# Patient Record
Sex: MALE | Race: WHITE | Employment: OTHER | URBAN - METROPOLITAN AREA
[De-identification: names, ages, dates, MRNs, and addresses within clinical notes are randomized per-mention and may not be internally consistent; named-entity substitution may affect disease eponyms.]

---

## 2017-05-03 PROBLEM — D75.839 THROMBOCYTOSIS: Status: ACTIVE | Noted: 2017-05-03

## 2017-05-03 PROBLEM — D72.829 LEUKOCYTOSIS: Status: ACTIVE | Noted: 2017-05-03

## 2017-05-09 PROBLEM — N18.30 CKD (CHRONIC KIDNEY DISEASE) STAGE 3, GFR 30-59 ML/MIN (HCC): Status: ACTIVE | Noted: 2017-05-09

## 2017-05-19 PROBLEM — D47.1 MYELOPROLIFERATIVE DISORDER (HCC): Status: ACTIVE | Noted: 2017-05-19

## 2017-06-09 PROBLEM — Z79.899 ENCOUNTER FOR LONG-TERM CURRENT USE OF HIGH RISK MEDICATION: Status: ACTIVE | Noted: 2017-06-09

## 2017-07-27 ENCOUNTER — HOSPITAL ENCOUNTER (OUTPATIENT)
Dept: OTHER | Age: 82
Discharge: HOME | End: 2017-07-31
Attending: PHYSICAL MEDICINE & REHABILITATION | Admitting: PHYSICAL MEDICINE & REHABILITATION

## 2018-06-11 ENCOUNTER — HOSPITAL ENCOUNTER (OUTPATIENT)
Dept: ONCOLOGY | Age: 83
Discharge: HOME | End: 2018-06-30
Attending: INTERNAL MEDICINE | Admitting: INTERNAL MEDICINE

## 2018-06-11 ENCOUNTER — HOSPITAL ENCOUNTER (OUTPATIENT)
Dept: CT IMAGING | Age: 83
Discharge: HOME | End: 2018-06-11
Attending: PHYSICAL MEDICINE & REHABILITATION | Admitting: PHYSICAL MEDICINE & REHABILITATION

## 2018-06-11 DIAGNOSIS — R63.4 ABNORMAL WEIGHT LOSS: ICD-10-CM

## 2018-06-11 LAB
ABO/RH: NORMAL
ANTIBODY SCREEN: NORMAL

## 2018-06-13 ENCOUNTER — HOSPITAL ENCOUNTER (OUTPATIENT)
Dept: ONCOLOGY | Age: 83
Discharge: HOME | End: 2018-06-14
Admitting: INTERNAL MEDICINE

## 2018-06-13 VITALS
HEIGHT: 69 IN | BODY MASS INDEX: 24.44 KG/M2 | RESPIRATION RATE: 18 BRPM | TEMPERATURE: 97.1 F | SYSTOLIC BLOOD PRESSURE: 107 MMHG | HEART RATE: 68 BPM | WEIGHT: 165 LBS | OXYGEN SATURATION: 96 % | DIASTOLIC BLOOD PRESSURE: 47 MMHG

## 2018-06-13 LAB
ABO/RH: NORMAL
BLOOD BANK DISPENSE STATUS: NORMAL
BLOOD BANK PRODUCT CODE: NORMAL
BPU ID: NORMAL
DESCRIPTION BLOOD BANK: NORMAL
FERRITIN: 143.7 NG/ML (ref 30–400)
FOLATE: >20 NG/ML (ref 4.78–24.2)
HCT VFR BLD CALC: 21 % (ref 40.5–52.5)
IMMATURE RETIC FRACT: 0.56 (ref 0.21–0.37)
IRON SATURATION: 48 % (ref 20–50)
IRON: 109 UG/DL (ref 59–158)
RETICULOCYTE ABSOLUTE COUNT: 0.05 M/UL
RETICULOCYTE COUNT PCT: 2.62 % (ref 0.5–2.18)
TOTAL IRON BINDING CAPACITY: 226 UG/DL (ref 260–445)

## 2018-06-13 RX ORDER — DIPHENHYDRAMINE HCL 25 MG
25 TABLET ORAL ONCE
Status: COMPLETED | OUTPATIENT
Start: 2018-06-13 | End: 2018-06-13

## 2018-06-13 RX ORDER — ACETAMINOPHEN 325 MG/1
650 TABLET ORAL ONCE
Status: COMPLETED | OUTPATIENT
Start: 2018-06-13 | End: 2018-06-13

## 2018-06-13 RX ADMIN — Medication 25 MG: at 09:51

## 2018-06-13 RX ADMIN — ACETAMINOPHEN 650 MG: 325 TABLET ORAL at 09:50

## 2018-06-13 ASSESSMENT — PAIN SCALES - GENERAL: PAINLEVEL_OUTOF10: 8

## 2018-06-13 ASSESSMENT — PAIN - FUNCTIONAL ASSESSMENT: PAIN_FUNCTIONAL_ASSESSMENT: 0-10

## 2018-06-14 LAB — ERYTHROPOIETIN: 116 MU/ML (ref 4–27)

## 2018-06-20 LAB
ABO/RH: NORMAL
ANTIBODY SCREEN: NORMAL

## 2018-06-21 ENCOUNTER — HOSPITAL ENCOUNTER (OUTPATIENT)
Dept: ONCOLOGY | Age: 83
Discharge: HOME | End: 2018-06-22
Admitting: INTERNAL MEDICINE

## 2018-06-21 VITALS
DIASTOLIC BLOOD PRESSURE: 60 MMHG | SYSTOLIC BLOOD PRESSURE: 137 MMHG | RESPIRATION RATE: 18 BRPM | BODY MASS INDEX: 24.88 KG/M2 | HEART RATE: 73 BPM | WEIGHT: 168 LBS | HEIGHT: 69 IN | TEMPERATURE: 98.3 F

## 2018-06-21 LAB
BLOOD BANK DISPENSE STATUS: NORMAL
BLOOD BANK DISPENSE STATUS: NORMAL
BLOOD BANK PRODUCT CODE: NORMAL
BLOOD BANK PRODUCT CODE: NORMAL
BPU ID: NORMAL
BPU ID: NORMAL
DESCRIPTION BLOOD BANK: NORMAL
DESCRIPTION BLOOD BANK: NORMAL

## 2018-06-21 RX ORDER — DIPHENHYDRAMINE HCL 25 MG
25 TABLET ORAL ONCE
Status: COMPLETED | OUTPATIENT
Start: 2018-06-21 | End: 2018-06-21

## 2018-06-21 RX ORDER — ACETAMINOPHEN 325 MG/1
650 TABLET ORAL ONCE
Status: COMPLETED | OUTPATIENT
Start: 2018-06-21 | End: 2018-06-21

## 2018-06-21 RX ADMIN — ACETAMINOPHEN 650 MG: 325 TABLET ORAL at 09:26

## 2018-06-21 RX ADMIN — Medication 25 MG: at 09:35

## 2018-06-21 ASSESSMENT — PAIN SCALES - GENERAL: PAINLEVEL_OUTOF10: 0

## 2018-06-21 ASSESSMENT — PAIN - FUNCTIONAL ASSESSMENT: PAIN_FUNCTIONAL_ASSESSMENT: 0-10

## 2018-07-01 ENCOUNTER — HOSPITAL ENCOUNTER (OUTPATIENT)
Dept: ONCOLOGY | Age: 83
Discharge: HOME | End: 2018-07-01
Attending: INTERNAL MEDICINE | Admitting: INTERNAL MEDICINE

## 2018-07-11 ENCOUNTER — HOSPITAL ENCOUNTER (OUTPATIENT)
Dept: CT IMAGING | Age: 83
Discharge: HOME | End: 2018-07-11
Attending: INTERNAL MEDICINE | Admitting: INTERNAL MEDICINE

## 2018-07-11 DIAGNOSIS — R06.02 SHORTNESS OF BREATH: ICD-10-CM

## 2018-07-11 DIAGNOSIS — I26.99 OTHER ACUTE PULMONARY EMBOLISM WITHOUT ACUTE COR PULMONALE (HCC): ICD-10-CM

## 2019-01-01 ENCOUNTER — APPOINTMENT (OUTPATIENT)
Dept: GENERAL RADIOLOGY | Age: 84
DRG: 444 | End: 2019-01-01
Payer: MEDICARE

## 2019-01-01 ENCOUNTER — APPOINTMENT (OUTPATIENT)
Dept: CT IMAGING | Age: 84
DRG: 444 | End: 2019-01-01
Payer: MEDICARE

## 2019-01-01 ENCOUNTER — APPOINTMENT (OUTPATIENT)
Dept: ULTRASOUND IMAGING | Age: 84
DRG: 444 | End: 2019-01-01
Payer: MEDICARE

## 2019-01-01 ENCOUNTER — APPOINTMENT (OUTPATIENT)
Dept: NUCLEAR MEDICINE | Age: 84
DRG: 444 | End: 2019-01-01
Payer: MEDICARE

## 2019-01-01 ENCOUNTER — HOSPITAL ENCOUNTER (INPATIENT)
Age: 84
LOS: 5 days | DRG: 444 | End: 2019-07-29
Attending: EMERGENCY MEDICINE | Admitting: INTERNAL MEDICINE
Payer: MEDICARE

## 2019-01-01 VITALS
OXYGEN SATURATION: 92 % | HEIGHT: 68 IN | HEART RATE: 82 BPM | WEIGHT: 136.7 LBS | TEMPERATURE: 98.3 F | SYSTOLIC BLOOD PRESSURE: 102 MMHG | BODY MASS INDEX: 20.72 KG/M2 | RESPIRATION RATE: 20 BRPM | DIASTOLIC BLOOD PRESSURE: 65 MMHG

## 2019-01-01 DIAGNOSIS — K81.9 CHOLECYSTITIS: Primary | ICD-10-CM

## 2019-01-01 DIAGNOSIS — E86.0 DEHYDRATION: ICD-10-CM

## 2019-01-01 DIAGNOSIS — A41.9 SEPTICEMIA (HCC): ICD-10-CM

## 2019-01-01 DIAGNOSIS — E83.42 HYPOMAGNESEMIA: ICD-10-CM

## 2019-01-01 LAB
A/G RATIO: 0.8 (ref 1.1–2.2)
A/G RATIO: 0.8 (ref 1.1–2.2)
ALBUMIN SERPL-MCNC: 2.3 G/DL (ref 3.4–5)
ALBUMIN SERPL-MCNC: 2.5 G/DL (ref 3.4–5)
ALBUMIN SERPL-MCNC: 2.7 G/DL (ref 3.4–5)
ALBUMIN SERPL-MCNC: 2.9 G/DL (ref 3.4–5)
ALP BLD-CCNC: 251 U/L (ref 40–129)
ALP BLD-CCNC: 275 U/L (ref 40–129)
ALP BLD-CCNC: 277 U/L (ref 40–129)
ALP BLD-CCNC: 301 U/L (ref 40–129)
ALT SERPL-CCNC: 12 U/L (ref 10–40)
ALT SERPL-CCNC: 36 U/L (ref 10–40)
ALT SERPL-CCNC: 36 U/L (ref 10–40)
ALT SERPL-CCNC: <5 U/L (ref 10–40)
ANION GAP SERPL CALCULATED.3IONS-SCNC: 10 MMOL/L (ref 3–16)
ANION GAP SERPL CALCULATED.3IONS-SCNC: 11 MMOL/L (ref 3–16)
ANION GAP SERPL CALCULATED.3IONS-SCNC: 12 MMOL/L (ref 3–16)
ANION GAP SERPL CALCULATED.3IONS-SCNC: 13 MMOL/L (ref 3–16)
ANION GAP SERPL CALCULATED.3IONS-SCNC: 14 MMOL/L (ref 3–16)
ANION GAP SERPL CALCULATED.3IONS-SCNC: 22 MMOL/L (ref 3–16)
ANION GAP SERPL CALCULATED.3IONS-SCNC: 8 MMOL/L (ref 3–16)
ANISOCYTOSIS: ABNORMAL
AST SERPL-CCNC: 26 U/L (ref 15–37)
AST SERPL-CCNC: 26 U/L (ref 15–37)
AST SERPL-CCNC: 27 U/L (ref 15–37)
AST SERPL-CCNC: 35 U/L (ref 15–37)
ATYPICAL LYMPHOCYTE RELATIVE PERCENT: 1 % (ref 0–6)
ATYPICAL LYMPHOCYTE RELATIVE PERCENT: 2 % (ref 0–6)
BACTERIA: ABNORMAL /HPF
BANDED NEUTROPHILS RELATIVE PERCENT: 29 % (ref 0–7)
BANDED NEUTROPHILS RELATIVE PERCENT: 35 % (ref 0–7)
BANDED NEUTROPHILS RELATIVE PERCENT: 40 % (ref 0–7)
BANDED NEUTROPHILS RELATIVE PERCENT: 50 % (ref 0–7)
BANDED NEUTROPHILS RELATIVE PERCENT: 59 % (ref 0–7)
BASE EXCESS VENOUS: -1.1 MMOL/L (ref -3–3)
BASOPHILIC STIPPLING: ABNORMAL
BASOPHILS ABSOLUTE: 0 K/UL (ref 0–0.2)
BASOPHILS ABSOLUTE: 0.9 K/UL (ref 0–0.2)
BASOPHILS RELATIVE PERCENT: 0 %
BASOPHILS RELATIVE PERCENT: 1 %
BILIRUB SERPL-MCNC: 0.4 MG/DL (ref 0–1)
BILIRUB SERPL-MCNC: 0.4 MG/DL (ref 0–1)
BILIRUB SERPL-MCNC: 0.5 MG/DL (ref 0–1)
BILIRUB SERPL-MCNC: 0.7 MG/DL (ref 0–1)
BILIRUBIN DIRECT: 0.3 MG/DL (ref 0–0.3)
BILIRUBIN DIRECT: <0.2 MG/DL (ref 0–0.3)
BILIRUBIN URINE: ABNORMAL
BILIRUBIN URINE: NEGATIVE
BILIRUBIN, INDIRECT: 0.4 MG/DL (ref 0–1)
BILIRUBIN, INDIRECT: ABNORMAL MG/DL (ref 0–1)
BLASTS RELATIVE PERCENT: 1 %
BLASTS RELATIVE PERCENT: 10 %
BLASTS RELATIVE PERCENT: 3 %
BLASTS RELATIVE PERCENT: 7 %
BLOOD CULTURE, ROUTINE: NORMAL
BLOOD, URINE: ABNORMAL
BLOOD, URINE: NEGATIVE
BUN BLDV-MCNC: 17 MG/DL (ref 7–20)
BUN BLDV-MCNC: 18 MG/DL (ref 7–20)
BUN BLDV-MCNC: 22 MG/DL (ref 7–20)
BUN BLDV-MCNC: 31 MG/DL (ref 7–20)
BUN BLDV-MCNC: 36 MG/DL (ref 7–20)
BUN BLDV-MCNC: 45 MG/DL (ref 7–20)
BUN BLDV-MCNC: 60 MG/DL (ref 7–20)
CALCIUM SERPL-MCNC: 8.3 MG/DL (ref 8.3–10.6)
CALCIUM SERPL-MCNC: 8.4 MG/DL (ref 8.3–10.6)
CALCIUM SERPL-MCNC: 8.5 MG/DL (ref 8.3–10.6)
CALCIUM SERPL-MCNC: 8.6 MG/DL (ref 8.3–10.6)
CALCIUM SERPL-MCNC: 8.7 MG/DL (ref 8.3–10.6)
CALCIUM SERPL-MCNC: 8.9 MG/DL (ref 8.3–10.6)
CALCIUM SERPL-MCNC: 9 MG/DL (ref 8.3–10.6)
CARBOXYHEMOGLOBIN: 1.7 % (ref 0–1.5)
CASTS: ABNORMAL /LPF
CHLORIDE BLD-SCNC: 100 MMOL/L (ref 99–110)
CHLORIDE BLD-SCNC: 101 MMOL/L (ref 99–110)
CHLORIDE BLD-SCNC: 101 MMOL/L (ref 99–110)
CHLORIDE BLD-SCNC: 104 MMOL/L (ref 99–110)
CHLORIDE BLD-SCNC: 94 MMOL/L (ref 99–110)
CHLORIDE BLD-SCNC: 95 MMOL/L (ref 99–110)
CHLORIDE BLD-SCNC: 98 MMOL/L (ref 99–110)
CLARITY: ABNORMAL
CLARITY: CLEAR
CO2: 12 MMOL/L (ref 21–32)
CO2: 19 MMOL/L (ref 21–32)
CO2: 21 MMOL/L (ref 21–32)
CO2: 22 MMOL/L (ref 21–32)
CO2: 24 MMOL/L (ref 21–32)
CO2: 24 MMOL/L (ref 21–32)
CO2: 25 MMOL/L (ref 21–32)
COLOR: ABNORMAL
COLOR: YELLOW
COMMENT UA: ABNORMAL
CREAT SERPL-MCNC: 0.7 MG/DL (ref 0.8–1.3)
CREAT SERPL-MCNC: 0.8 MG/DL (ref 0.8–1.3)
CREAT SERPL-MCNC: 1 MG/DL (ref 0.8–1.3)
CREAT SERPL-MCNC: 1 MG/DL (ref 0.8–1.3)
CREAT SERPL-MCNC: 1.8 MG/DL (ref 0.8–1.3)
DOHLE BODIES: PRESENT
DOHLE BODIES: PRESENT
EKG ATRIAL RATE: 97 BPM
EKG DIAGNOSIS: NORMAL
EKG P AXIS: 67 DEGREES
EKG P-R INTERVAL: 246 MS
EKG Q-T INTERVAL: 356 MS
EKG QRS DURATION: 104 MS
EKG QTC CALCULATION (BAZETT): 452 MS
EKG R AXIS: -53 DEGREES
EKG T AXIS: 68 DEGREES
EKG VENTRICULAR RATE: 97 BPM
EOSINOPHILS ABSOLUTE: 0 K/UL (ref 0–0.6)
EOSINOPHILS ABSOLUTE: 0.8 K/UL (ref 0–0.6)
EOSINOPHILS ABSOLUTE: 0.9 K/UL (ref 0–0.6)
EOSINOPHILS ABSOLUTE: 1.1 K/UL (ref 0–0.6)
EOSINOPHILS ABSOLUTE: 2.7 K/UL (ref 0–0.6)
EOSINOPHILS RELATIVE PERCENT: 0 %
EOSINOPHILS RELATIVE PERCENT: 1 %
EOSINOPHILS RELATIVE PERCENT: 4 %
GFR AFRICAN AMERICAN: 43
GFR AFRICAN AMERICAN: >60
GFR NON-AFRICAN AMERICAN: 36
GFR NON-AFRICAN AMERICAN: >60
GLOBULIN: 3.4 G/DL
GLOBULIN: 3.8 G/DL
GLUCOSE BLD-MCNC: 100 MG/DL (ref 70–99)
GLUCOSE BLD-MCNC: 102 MG/DL (ref 70–99)
GLUCOSE BLD-MCNC: 107 MG/DL (ref 70–99)
GLUCOSE BLD-MCNC: 164 MG/DL (ref 70–99)
GLUCOSE BLD-MCNC: 194 MG/DL (ref 70–99)
GLUCOSE BLD-MCNC: 204 MG/DL (ref 70–99)
GLUCOSE BLD-MCNC: 205 MG/DL (ref 70–99)
GLUCOSE BLD-MCNC: 210 MG/DL (ref 70–99)
GLUCOSE BLD-MCNC: 211 MG/DL (ref 70–99)
GLUCOSE BLD-MCNC: 230 MG/DL (ref 70–99)
GLUCOSE BLD-MCNC: 236 MG/DL (ref 70–99)
GLUCOSE BLD-MCNC: 238 MG/DL (ref 70–99)
GLUCOSE BLD-MCNC: 240 MG/DL (ref 70–99)
GLUCOSE BLD-MCNC: 312 MG/DL (ref 70–99)
GLUCOSE BLD-MCNC: 313 MG/DL (ref 70–99)
GLUCOSE BLD-MCNC: 321 MG/DL (ref 70–99)
GLUCOSE BLD-MCNC: 327 MG/DL (ref 70–99)
GLUCOSE BLD-MCNC: 348 MG/DL (ref 70–99)
GLUCOSE BLD-MCNC: 351 MG/DL (ref 70–99)
GLUCOSE BLD-MCNC: 356 MG/DL (ref 70–99)
GLUCOSE BLD-MCNC: 357 MG/DL (ref 70–99)
GLUCOSE BLD-MCNC: 36 MG/DL (ref 70–99)
GLUCOSE BLD-MCNC: 36 MG/DL (ref 70–99)
GLUCOSE BLD-MCNC: 368 MG/DL (ref 70–99)
GLUCOSE BLD-MCNC: 382 MG/DL (ref 70–99)
GLUCOSE BLD-MCNC: 390 MG/DL (ref 70–99)
GLUCOSE BLD-MCNC: 392 MG/DL (ref 70–99)
GLUCOSE BLD-MCNC: 411 MG/DL (ref 70–99)
GLUCOSE BLD-MCNC: 53 MG/DL (ref 70–99)
GLUCOSE BLD-MCNC: 61 MG/DL (ref 70–99)
GLUCOSE BLD-MCNC: 69 MG/DL (ref 70–99)
GLUCOSE BLD-MCNC: 79 MG/DL (ref 70–99)
GLUCOSE URINE: 100 MG/DL
GLUCOSE URINE: NEGATIVE MG/DL
HCO3 VENOUS: 23.1 MMOL/L (ref 23–29)
HCT VFR BLD CALC: 27 % (ref 40.5–52.5)
HCT VFR BLD CALC: 28.1 % (ref 40.5–52.5)
HCT VFR BLD CALC: 28.4 % (ref 40.5–52.5)
HCT VFR BLD CALC: 28.8 % (ref 40.5–52.5)
HCT VFR BLD CALC: 29.9 % (ref 40.5–52.5)
HCT VFR BLD CALC: 30.9 % (ref 40.5–52.5)
HEMATOLOGY PATH CONSULT: NO
HEMATOLOGY PATH CONSULT: NORMAL
HEMATOLOGY PATH CONSULT: YES
HEMOGLOBIN: 8.3 G/DL (ref 13.5–17.5)
HEMOGLOBIN: 8.5 G/DL (ref 13.5–17.5)
HEMOGLOBIN: 8.9 G/DL (ref 13.5–17.5)
HEMOGLOBIN: 9.5 G/DL (ref 13.5–17.5)
KETONES, URINE: 15 MG/DL
KETONES, URINE: ABNORMAL MG/DL
LACTIC ACID: 2 MMOL/L (ref 0.4–2)
LACTIC ACID: 2.1 MMOL/L (ref 0.4–2)
LACTIC ACID: 4.1 MMOL/L (ref 0.4–2)
LEUKOCYTE ESTERASE, URINE: ABNORMAL
LEUKOCYTE ESTERASE, URINE: NEGATIVE
LIPASE: 169 U/L (ref 13–60)
LIPASE: 31 U/L (ref 13–60)
LIPASE: 70 U/L (ref 13–60)
LYMPHOCYTES ABSOLUTE: 10.6 K/UL (ref 1–5.1)
LYMPHOCYTES ABSOLUTE: 12.2 K/UL (ref 1–5.1)
LYMPHOCYTES ABSOLUTE: 6.5 K/UL (ref 1–5.1)
LYMPHOCYTES ABSOLUTE: 7.3 K/UL (ref 1–5.1)
LYMPHOCYTES ABSOLUTE: 8.6 K/UL (ref 1–5.1)
LYMPHOCYTES RELATIVE PERCENT: 10 %
LYMPHOCYTES RELATIVE PERCENT: 11 %
LYMPHOCYTES RELATIVE PERCENT: 16 %
LYMPHOCYTES RELATIVE PERCENT: 3 %
LYMPHOCYTES RELATIVE PERCENT: 7 %
MACROCYTES: ABNORMAL
MAGNESIUM: 1.4 MG/DL (ref 1.8–2.4)
MCH RBC QN AUTO: 29.9 PG (ref 26–34)
MCH RBC QN AUTO: 30.4 PG (ref 26–34)
MCH RBC QN AUTO: 30.6 PG (ref 26–34)
MCH RBC QN AUTO: 31 PG (ref 26–34)
MCHC RBC AUTO-ENTMCNC: 29.5 G/DL (ref 31–36)
MCHC RBC AUTO-ENTMCNC: 29.7 G/DL (ref 31–36)
MCHC RBC AUTO-ENTMCNC: 30.7 G/DL (ref 31–36)
MCHC RBC AUTO-ENTMCNC: 30.9 G/DL (ref 31–36)
MCHC RBC AUTO-ENTMCNC: 31.2 G/DL (ref 31–36)
MCHC RBC AUTO-ENTMCNC: 31.3 G/DL (ref 31–36)
MCV RBC AUTO: 100.8 FL (ref 80–100)
MCV RBC AUTO: 105 FL (ref 80–100)
MCV RBC AUTO: 98.9 FL (ref 80–100)
MCV RBC AUTO: 99.1 FL (ref 80–100)
MCV RBC AUTO: 99.2 FL (ref 80–100)
MCV RBC AUTO: 99.3 FL (ref 80–100)
METAMYELOCYTES RELATIVE PERCENT: 1 %
METAMYELOCYTES RELATIVE PERCENT: 14 %
METAMYELOCYTES RELATIVE PERCENT: 3 %
METAMYELOCYTES RELATIVE PERCENT: 9 %
METAMYELOCYTES RELATIVE PERCENT: 9 %
METHEMOGLOBIN VENOUS: 0.5 %
MICROCYTES: ABNORMAL
MICROCYTES: ABNORMAL
MICROSCOPIC EXAMINATION: YES
MICROSCOPIC EXAMINATION: YES
MONOCYTES ABSOLUTE: 0.8 K/UL (ref 0–1.3)
MONOCYTES ABSOLUTE: 0.9 K/UL (ref 0–1.3)
MONOCYTES ABSOLUTE: 2 K/UL (ref 0–1.3)
MONOCYTES ABSOLUTE: 2.2 K/UL (ref 0–1.3)
MONOCYTES ABSOLUTE: 5.9 K/UL (ref 0–1.3)
MONOCYTES RELATIVE PERCENT: 1 %
MONOCYTES RELATIVE PERCENT: 1 %
MONOCYTES RELATIVE PERCENT: 2 %
MONOCYTES RELATIVE PERCENT: 3 %
MONOCYTES RELATIVE PERCENT: 4 %
MONONUCLEAR UNIDENTIFIED CELLS: 2 %
MRSA SCREEN RT-PCR: ABNORMAL
MRSA SCREEN RT-PCR: ABNORMAL
MUCUS: ABNORMAL /LPF
MYELOCYTE PERCENT: 2 %
MYELOCYTE PERCENT: 2 %
MYELOCYTE PERCENT: 4 %
MYELOCYTE PERCENT: 6 %
NEUTROPHILS ABSOLUTE: 123.1 K/UL (ref 1.7–7.7)
NEUTROPHILS ABSOLUTE: 49.7 K/UL (ref 1.7–7.7)
NEUTROPHILS ABSOLUTE: 59.9 K/UL (ref 1.7–7.7)
NEUTROPHILS ABSOLUTE: 81.3 K/UL (ref 1.7–7.7)
NEUTROPHILS ABSOLUTE: 94.4 K/UL (ref 1.7–7.7)
NEUTROPHILS RELATIVE PERCENT: 20 %
NEUTROPHILS RELATIVE PERCENT: 24 %
NEUTROPHILS RELATIVE PERCENT: 31 %
NEUTROPHILS RELATIVE PERCENT: 32 %
NEUTROPHILS RELATIVE PERCENT: 38 %
NITRITE, URINE: ABNORMAL
NITRITE, URINE: NEGATIVE
NUCLEATED RED BLOOD CELLS: 1 /100 WBC
NUCLEATED RED BLOOD CELLS: 11 /100 WBC
NUCLEATED RED BLOOD CELLS: 4 /100 WBC
NUCLEATED RED BLOOD CELLS: 8 /100 WBC
O2 CONTENT, VEN: 13 VOL %
O2 SAT, VEN: 95 %
O2 THERAPY: ABNORMAL
ORGANISM: ABNORMAL
OVALOCYTES: ABNORMAL
PCO2, VEN: 36.2 MMHG (ref 40–50)
PDW BLD-RTO: 17.2 % (ref 12.4–15.4)
PDW BLD-RTO: 17.3 % (ref 12.4–15.4)
PDW BLD-RTO: 17.4 % (ref 12.4–15.4)
PDW BLD-RTO: 17.8 % (ref 12.4–15.4)
PDW BLD-RTO: 18.1 % (ref 12.4–15.4)
PDW BLD-RTO: 19.1 % (ref 12.4–15.4)
PERFORMED ON: ABNORMAL
PERFORMED ON: NORMAL
PH UA: 6 (ref 5–8)
PH UA: 6.5 (ref 5–8)
PH VENOUS: 7.42 (ref 7.35–7.45)
PLATELET # BLD: 177 K/UL (ref 135–450)
PLATELET # BLD: 256 K/UL (ref 135–450)
PLATELET # BLD: 424 K/UL (ref 135–450)
PLATELET # BLD: 437 K/UL (ref 135–450)
PLATELET # BLD: 447 K/UL (ref 135–450)
PLATELET # BLD: 449 K/UL (ref 135–450)
PLATELET SLIDE REVIEW: ABNORMAL
PLATELET SLIDE REVIEW: ADEQUATE
PMV BLD AUTO: 7 FL (ref 5–10.5)
PMV BLD AUTO: 7.1 FL (ref 5–10.5)
PMV BLD AUTO: 7.2 FL (ref 5–10.5)
PMV BLD AUTO: 7.3 FL (ref 5–10.5)
PMV BLD AUTO: 8.3 FL (ref 5–10.5)
PMV BLD AUTO: 8.5 FL (ref 5–10.5)
PO2, VEN: 76.4 MMHG (ref 25–40)
POIKILOCYTES: ABNORMAL
POLYCHROMASIA: ABNORMAL
POTASSIUM REFLEX MAGNESIUM: 4.6 MMOL/L (ref 3.5–5.1)
POTASSIUM REFLEX MAGNESIUM: 4.6 MMOL/L (ref 3.5–5.1)
POTASSIUM REFLEX MAGNESIUM: 5.2 MMOL/L (ref 3.5–5.1)
POTASSIUM REFLEX MAGNESIUM: 5.2 MMOL/L (ref 3.5–5.1)
POTASSIUM REFLEX MAGNESIUM: 5.3 MMOL/L (ref 3.5–5.1)
POTASSIUM REFLEX MAGNESIUM: 5.6 MMOL/L (ref 3.5–5.1)
POTASSIUM SERPL-SCNC: 4.6 MMOL/L (ref 3.5–5.1)
PRO-BNP: 1116 PG/ML (ref 0–449)
PRO-BNP: 1445 PG/ML (ref 0–449)
PROCALCITONIN: 1.44 NG/ML (ref 0–0.15)
PROTEIN UA: 100 MG/DL
PROTEIN UA: ABNORMAL MG/DL
RBC # BLD: 2.68 M/UL (ref 4.2–5.9)
RBC # BLD: 2.73 M/UL (ref 4.2–5.9)
RBC # BLD: 2.86 M/UL (ref 4.2–5.9)
RBC # BLD: 2.92 M/UL (ref 4.2–5.9)
RBC # BLD: 2.97 M/UL (ref 4.2–5.9)
RBC # BLD: 3.12 M/UL (ref 4.2–5.9)
RBC UA: ABNORMAL /HPF (ref 0–2)
RBC UA: ABNORMAL /HPF (ref 0–2)
SCHISTOCYTES: ABNORMAL
SLIDE REVIEW: ABNORMAL
SMUDGE CELLS: PRESENT
SODIUM BLD-SCNC: 130 MMOL/L (ref 136–145)
SODIUM BLD-SCNC: 130 MMOL/L (ref 136–145)
SODIUM BLD-SCNC: 131 MMOL/L (ref 136–145)
SODIUM BLD-SCNC: 132 MMOL/L (ref 136–145)
SODIUM BLD-SCNC: 134 MMOL/L (ref 136–145)
SODIUM BLD-SCNC: 135 MMOL/L (ref 136–145)
SODIUM BLD-SCNC: 138 MMOL/L (ref 136–145)
SPECIFIC GRAVITY UA: 1.02 (ref 1–1.03)
SPECIFIC GRAVITY UA: 1.02 (ref 1–1.03)
SPHEROCYTES: ABNORMAL
TCO2 CALC VENOUS: 24 MMOL/L
TOTAL PROTEIN: 5.6 G/DL (ref 6.4–8.2)
TOTAL PROTEIN: 6 G/DL (ref 6.4–8.2)
TOTAL PROTEIN: 6.1 G/DL (ref 6.4–8.2)
TOTAL PROTEIN: 6.7 G/DL (ref 6.4–8.2)
TROPONIN: <0.01 NG/ML
URINE REFLEX TO CULTURE: YES
URINE TYPE: ABNORMAL
URINE TYPE: ABNORMAL
UROBILINOGEN, URINE: 0.2 E.U./DL
UROBILINOGEN, URINE: ABNORMAL E.U./DL
VACUOLATED NEUTROPHILS: PRESENT
WBC # BLD: 111 K/UL (ref 4–11)
WBC # BLD: 146.6 K/UL (ref 4–11)
WBC # BLD: 66.3 K/UL (ref 4–11)
WBC # BLD: 68.4 K/UL (ref 4–11)
WBC # BLD: 77.8 K/UL (ref 4–11)
WBC # BLD: 93.4 K/UL (ref 4–11)
WBC UA: >100 /HPF (ref 0–5)
WBC UA: ABNORMAL /HPF (ref 0–5)

## 2019-01-01 PROCEDURE — 80048 BASIC METABOLIC PNL TOTAL CA: CPT

## 2019-01-01 PROCEDURE — 85025 COMPLETE CBC W/AUTO DIFF WBC: CPT

## 2019-01-01 PROCEDURE — 6370000000 HC RX 637 (ALT 250 FOR IP): Performed by: INTERNAL MEDICINE

## 2019-01-01 PROCEDURE — 83690 ASSAY OF LIPASE: CPT

## 2019-01-01 PROCEDURE — 36415 COLL VENOUS BLD VENIPUNCTURE: CPT

## 2019-01-01 PROCEDURE — 83605 ASSAY OF LACTIC ACID: CPT

## 2019-01-01 PROCEDURE — 87040 BLOOD CULTURE FOR BACTERIA: CPT

## 2019-01-01 PROCEDURE — 71045 X-RAY EXAM CHEST 1 VIEW: CPT

## 2019-01-01 PROCEDURE — 85027 COMPLETE CBC AUTOMATED: CPT

## 2019-01-01 PROCEDURE — 6370000000 HC RX 637 (ALT 250 FOR IP): Performed by: REGISTERED NURSE

## 2019-01-01 PROCEDURE — 99152 MOD SED SAME PHYS/QHP 5/>YRS: CPT | Performed by: INTERNAL MEDICINE

## 2019-01-01 PROCEDURE — 6370000000 HC RX 637 (ALT 250 FOR IP): Performed by: NURSE PRACTITIONER

## 2019-01-01 PROCEDURE — 2580000003 HC RX 258

## 2019-01-01 PROCEDURE — 2580000003 HC RX 258: Performed by: PHYSICIAN ASSISTANT

## 2019-01-01 PROCEDURE — 2580000003 HC RX 258: Performed by: INTERNAL MEDICINE

## 2019-01-01 PROCEDURE — 1200000000 HC SEMI PRIVATE

## 2019-01-01 PROCEDURE — 6360000004 HC RX CONTRAST MEDICATION: Performed by: EMERGENCY MEDICINE

## 2019-01-01 PROCEDURE — 74174 CTA ABD&PLVS W/CONTRAST: CPT

## 2019-01-01 PROCEDURE — 6360000002 HC RX W HCPCS: Performed by: INTERNAL MEDICINE

## 2019-01-01 PROCEDURE — 2580000003 HC RX 258: Performed by: EMERGENCY MEDICINE

## 2019-01-01 PROCEDURE — 80053 COMPREHEN METABOLIC PANEL: CPT

## 2019-01-01 PROCEDURE — 2700000000 HC OXYGEN THERAPY PER DAY

## 2019-01-01 PROCEDURE — 71046 X-RAY EXAM CHEST 2 VIEWS: CPT

## 2019-01-01 PROCEDURE — 83735 ASSAY OF MAGNESIUM: CPT

## 2019-01-01 PROCEDURE — 83880 ASSAY OF NATRIURETIC PEPTIDE: CPT

## 2019-01-01 PROCEDURE — 93010 ELECTROCARDIOGRAM REPORT: CPT | Performed by: INTERNAL MEDICINE

## 2019-01-01 PROCEDURE — 82803 BLOOD GASES ANY COMBINATION: CPT

## 2019-01-01 PROCEDURE — A9537 TC99M MEBROFENIN: HCPCS | Performed by: INTERNAL MEDICINE

## 2019-01-01 PROCEDURE — 7100000010 HC PHASE II RECOVERY - FIRST 15 MIN: Performed by: INTERNAL MEDICINE

## 2019-01-01 PROCEDURE — 51702 INSERT TEMP BLADDER CATH: CPT

## 2019-01-01 PROCEDURE — 80076 HEPATIC FUNCTION PANEL: CPT

## 2019-01-01 PROCEDURE — 99283 EMERGENCY DEPT VISIT LOW MDM: CPT

## 2019-01-01 PROCEDURE — 6360000002 HC RX W HCPCS: Performed by: REGISTERED NURSE

## 2019-01-01 PROCEDURE — 7100000011 HC PHASE II RECOVERY - ADDTL 15 MIN: Performed by: INTERNAL MEDICINE

## 2019-01-01 PROCEDURE — 2709999900 HC NON-CHARGEABLE SUPPLY: Performed by: INTERNAL MEDICINE

## 2019-01-01 PROCEDURE — 2500000003 HC RX 250 WO HCPCS: Performed by: PHYSICIAN ASSISTANT

## 2019-01-01 PROCEDURE — 3609017100 HC EGD: Performed by: INTERNAL MEDICINE

## 2019-01-01 PROCEDURE — 93005 ELECTROCARDIOGRAM TRACING: CPT | Performed by: EMERGENCY MEDICINE

## 2019-01-01 PROCEDURE — 87641 MR-STAPH DNA AMP PROBE: CPT

## 2019-01-01 PROCEDURE — 99222 1ST HOSP IP/OBS MODERATE 55: CPT | Performed by: SURGERY

## 2019-01-01 PROCEDURE — 81001 URINALYSIS AUTO W/SCOPE: CPT

## 2019-01-01 PROCEDURE — 78227 HEPATOBIL SYST IMAGE W/DRUG: CPT

## 2019-01-01 PROCEDURE — 2580000003 HC RX 258: Performed by: REGISTERED NURSE

## 2019-01-01 PROCEDURE — 94761 N-INVAS EAR/PLS OXIMETRY MLT: CPT

## 2019-01-01 PROCEDURE — 6360000002 HC RX W HCPCS: Performed by: EMERGENCY MEDICINE

## 2019-01-01 PROCEDURE — 87086 URINE CULTURE/COLONY COUNT: CPT

## 2019-01-01 PROCEDURE — 3430000000 HC RX DIAGNOSTIC RADIOPHARMACEUTICAL: Performed by: INTERNAL MEDICINE

## 2019-01-01 PROCEDURE — 99231 SBSQ HOSP IP/OBS SF/LOW 25: CPT | Performed by: SURGERY

## 2019-01-01 PROCEDURE — 84145 PROCALCITONIN (PCT): CPT

## 2019-01-01 PROCEDURE — 94150 VITAL CAPACITY TEST: CPT

## 2019-01-01 PROCEDURE — 94640 AIRWAY INHALATION TREATMENT: CPT

## 2019-01-01 PROCEDURE — 84484 ASSAY OF TROPONIN QUANT: CPT

## 2019-01-01 PROCEDURE — 0DJ08ZZ INSPECTION OF UPPER INTESTINAL TRACT, VIA NATURAL OR ARTIFICIAL OPENING ENDOSCOPIC: ICD-10-PCS | Performed by: INTERNAL MEDICINE

## 2019-01-01 PROCEDURE — C9113 INJ PANTOPRAZOLE SODIUM, VIA: HCPCS | Performed by: REGISTERED NURSE

## 2019-01-01 PROCEDURE — 76705 ECHO EXAM OF ABDOMEN: CPT

## 2019-01-01 RX ORDER — ATORVASTATIN CALCIUM 40 MG/1
40 TABLET, FILM COATED ORAL DAILY
Status: DISCONTINUED | OUTPATIENT
Start: 2019-01-01 | End: 2019-01-01

## 2019-01-01 RX ORDER — CELECOXIB 100 MG/1
200 CAPSULE ORAL DAILY
Status: DISCONTINUED | OUTPATIENT
Start: 2019-01-01 | End: 2019-01-01

## 2019-01-01 RX ORDER — DEXTROSE MONOHYDRATE 25 G/50ML
12.5 INJECTION, SOLUTION INTRAVENOUS PRN
Status: DISCONTINUED | OUTPATIENT
Start: 2019-01-01 | End: 2019-01-01 | Stop reason: SDUPTHER

## 2019-01-01 RX ORDER — SODIUM CHLORIDE 9 MG/ML
INJECTION, SOLUTION INTRAVENOUS
Status: DISPENSED
Start: 2019-01-01 | End: 2019-01-01

## 2019-01-01 RX ORDER — PANTOPRAZOLE SODIUM 40 MG/10ML
40 INJECTION, POWDER, LYOPHILIZED, FOR SOLUTION INTRAVENOUS DAILY
Status: DISCONTINUED | OUTPATIENT
Start: 2019-01-01 | End: 2019-01-01

## 2019-01-01 RX ORDER — MAGNESIUM SULFATE IN WATER 40 MG/ML
2 INJECTION, SOLUTION INTRAVENOUS ONCE
Status: COMPLETED | OUTPATIENT
Start: 2019-01-01 | End: 2019-01-01

## 2019-01-01 RX ORDER — M-VIT,TX,IRON,MINS/CALC/FOLIC 27MG-0.4MG
1 TABLET ORAL DAILY
Status: DISCONTINUED | OUTPATIENT
Start: 2019-01-01 | End: 2019-01-01

## 2019-01-01 RX ORDER — ZOLPIDEM TARTRATE 5 MG/1
5 TABLET ORAL NIGHTLY PRN
Status: DISCONTINUED | OUTPATIENT
Start: 2019-01-01 | End: 2019-01-01

## 2019-01-01 RX ORDER — 0.9 % SODIUM CHLORIDE 0.9 %
1000 INTRAVENOUS SOLUTION INTRAVENOUS ONCE
Status: CANCELLED | OUTPATIENT
Start: 2019-01-01 | End: 2019-01-01

## 2019-01-01 RX ORDER — MAGNESIUM SULFATE 1 G/100ML
1 INJECTION INTRAVENOUS PRN
Status: DISCONTINUED | OUTPATIENT
Start: 2019-01-01 | End: 2019-01-01

## 2019-01-01 RX ORDER — SODIUM CHLORIDE 0.9 % (FLUSH) 0.9 %
10 SYRINGE (ML) INJECTION PRN
Status: DISCONTINUED | OUTPATIENT
Start: 2019-01-01 | End: 2019-01-01

## 2019-01-01 RX ORDER — HYDROXYUREA 500 MG/1
1000 CAPSULE ORAL DAILY
Status: DISCONTINUED | OUTPATIENT
Start: 2019-01-01 | End: 2019-01-01

## 2019-01-01 RX ORDER — DEXTROSE MONOHYDRATE 50 MG/ML
100 INJECTION, SOLUTION INTRAVENOUS PRN
Status: DISCONTINUED | OUTPATIENT
Start: 2019-01-01 | End: 2019-01-01 | Stop reason: HOSPADM

## 2019-01-01 RX ORDER — POTASSIUM CHLORIDE 20 MEQ/1
40 TABLET, EXTENDED RELEASE ORAL PRN
Status: DISCONTINUED | OUTPATIENT
Start: 2019-01-01 | End: 2019-01-01

## 2019-01-01 RX ORDER — SENNA PLUS 8.6 MG/1
1 TABLET ORAL DAILY
Status: DISCONTINUED | OUTPATIENT
Start: 2019-01-01 | End: 2019-01-01

## 2019-01-01 RX ORDER — ONDANSETRON 2 MG/ML
4 INJECTION INTRAMUSCULAR; INTRAVENOUS EVERY 6 HOURS PRN
Status: DISCONTINUED | OUTPATIENT
Start: 2019-01-01 | End: 2019-01-01 | Stop reason: HOSPADM

## 2019-01-01 RX ORDER — ONDANSETRON 2 MG/ML
4 INJECTION INTRAMUSCULAR; INTRAVENOUS ONCE
Status: COMPLETED | OUTPATIENT
Start: 2019-01-01 | End: 2019-01-01

## 2019-01-01 RX ORDER — HYDROXYUREA 500 MG/1
500 CAPSULE ORAL EVERY OTHER DAY
Status: DISCONTINUED | OUTPATIENT
Start: 2019-01-01 | End: 2019-01-01

## 2019-01-01 RX ORDER — COLCHICINE 0.6 MG/1
0.6 TABLET ORAL 2 TIMES DAILY
Status: DISCONTINUED | OUTPATIENT
Start: 2019-01-01 | End: 2019-01-01

## 2019-01-01 RX ORDER — HYDROXYUREA 500 MG/1
500 CAPSULE ORAL ONCE
Status: COMPLETED | OUTPATIENT
Start: 2019-01-01 | End: 2019-01-01

## 2019-01-01 RX ORDER — HYDROXYUREA 500 MG/1
1000 CAPSULE ORAL EVERY OTHER DAY
Status: DISCONTINUED | OUTPATIENT
Start: 2019-01-01 | End: 2019-01-01

## 2019-01-01 RX ORDER — MORPHINE SULFATE 100 MG/5ML
5 SOLUTION ORAL
Status: DISCONTINUED | OUTPATIENT
Start: 2019-01-01 | End: 2019-01-01 | Stop reason: HOSPADM

## 2019-01-01 RX ORDER — PROPRANOLOL HYDROCHLORIDE 10 MG/1
20 TABLET ORAL 2 TIMES DAILY
Status: DISCONTINUED | OUTPATIENT
Start: 2019-01-01 | End: 2019-01-01

## 2019-01-01 RX ORDER — POTASSIUM CHLORIDE 7.45 MG/ML
10 INJECTION INTRAVENOUS PRN
Status: DISCONTINUED | OUTPATIENT
Start: 2019-01-01 | End: 2019-01-01

## 2019-01-01 RX ORDER — SODIUM CHLORIDE 0.9 % (FLUSH) 0.9 %
10 SYRINGE (ML) INJECTION EVERY 12 HOURS SCHEDULED
Status: DISCONTINUED | OUTPATIENT
Start: 2019-01-01 | End: 2019-01-01

## 2019-01-01 RX ORDER — IPRATROPIUM BROMIDE 21 UG/1
2 SPRAY, METERED NASAL 2 TIMES DAILY
Status: DISCONTINUED | OUTPATIENT
Start: 2019-01-01 | End: 2019-01-01

## 2019-01-01 RX ORDER — ASPIRIN 81 MG/1
81 TABLET ORAL DAILY
Status: DISCONTINUED | OUTPATIENT
Start: 2019-01-01 | End: 2019-01-01

## 2019-01-01 RX ORDER — LORAZEPAM 2 MG/ML
1 CONCENTRATE ORAL
Qty: 30 ML | Refills: 0 | Status: SHIPPED | OUTPATIENT
Start: 2019-01-01 | End: 2019-08-12

## 2019-01-01 RX ORDER — FENTANYL CITRATE 50 UG/ML
INJECTION, SOLUTION INTRAMUSCULAR; INTRAVENOUS PRN
Status: DISCONTINUED | OUTPATIENT
Start: 2019-01-01 | End: 2019-01-01

## 2019-01-01 RX ORDER — DEXTROSE MONOHYDRATE 25 G/50ML
12.5 INJECTION, SOLUTION INTRAVENOUS PRN
Status: DISCONTINUED | OUTPATIENT
Start: 2019-01-01 | End: 2019-01-01 | Stop reason: HOSPADM

## 2019-01-01 RX ORDER — SODIUM CHLORIDE 9 MG/ML
INJECTION, SOLUTION INTRAVENOUS
Status: COMPLETED
Start: 2019-01-01 | End: 2019-01-01

## 2019-01-01 RX ORDER — MORPHINE SULFATE 100 MG/5ML
5 SOLUTION ORAL
Qty: 30 ML | Refills: 0 | Status: SHIPPED | OUTPATIENT
Start: 2019-01-01 | End: 2019-08-08

## 2019-01-01 RX ORDER — MIDAZOLAM HYDROCHLORIDE 5 MG/ML
INJECTION INTRAMUSCULAR; INTRAVENOUS PRN
Status: DISCONTINUED | OUTPATIENT
Start: 2019-01-01 | End: 2019-01-01

## 2019-01-01 RX ORDER — MORPHINE SULFATE 2 MG/ML
2 INJECTION, SOLUTION INTRAMUSCULAR; INTRAVENOUS EVERY 4 HOURS PRN
Status: DISCONTINUED | OUTPATIENT
Start: 2019-01-01 | End: 2019-01-01

## 2019-01-01 RX ORDER — HYDROXYUREA 500 MG/1
1000 CAPSULE ORAL 2 TIMES DAILY
Status: DISCONTINUED | OUTPATIENT
Start: 2019-01-01 | End: 2019-01-01

## 2019-01-01 RX ORDER — DULOXETIN HYDROCHLORIDE 30 MG/1
30 CAPSULE, DELAYED RELEASE ORAL DAILY
Status: DISCONTINUED | OUTPATIENT
Start: 2019-01-01 | End: 2019-01-01

## 2019-01-01 RX ORDER — MORPHINE SULFATE 2 MG/ML
2 INJECTION, SOLUTION INTRAMUSCULAR; INTRAVENOUS
Status: DISCONTINUED | OUTPATIENT
Start: 2019-01-01 | End: 2019-01-01 | Stop reason: HOSPADM

## 2019-01-01 RX ORDER — CARBIDOPA/LEVODOPA 25MG-250MG
1 TABLET ORAL 3 TIMES DAILY
Status: DISCONTINUED | OUTPATIENT
Start: 2019-01-01 | End: 2019-01-01 | Stop reason: HOSPADM

## 2019-01-01 RX ORDER — NICOTINE POLACRILEX 4 MG
15 LOZENGE BUCCAL PRN
Status: DISCONTINUED | OUTPATIENT
Start: 2019-01-01 | End: 2019-01-01 | Stop reason: SDUPTHER

## 2019-01-01 RX ORDER — COLCHICINE 0.6 MG/1
0.6 TABLET ORAL
Status: ON HOLD | COMMUNITY
Start: 2019-01-01 | End: 2019-01-01 | Stop reason: HOSPADM

## 2019-01-01 RX ORDER — SODIUM CHLORIDE 9 MG/ML
INJECTION, SOLUTION INTRAVENOUS CONTINUOUS
Status: DISCONTINUED | OUTPATIENT
Start: 2019-01-01 | End: 2019-01-01

## 2019-01-01 RX ORDER — OXYCODONE HYDROCHLORIDE AND ACETAMINOPHEN 5; 325 MG/1; MG/1
1 TABLET ORAL EVERY 4 HOURS PRN
Status: DISCONTINUED | OUTPATIENT
Start: 2019-01-01 | End: 2019-01-01

## 2019-01-01 RX ORDER — TRAMADOL HYDROCHLORIDE 50 MG/1
50 TABLET ORAL EVERY 6 HOURS PRN
Status: DISCONTINUED | OUTPATIENT
Start: 2019-01-01 | End: 2019-01-01

## 2019-01-01 RX ORDER — ACETAMINOPHEN 325 MG/1
650 TABLET ORAL EVERY 4 HOURS PRN
Status: DISCONTINUED | OUTPATIENT
Start: 2019-01-01 | End: 2019-01-01 | Stop reason: HOSPADM

## 2019-01-01 RX ORDER — PRAVASTATIN SODIUM 40 MG
40 TABLET ORAL DAILY
Status: DISCONTINUED | OUTPATIENT
Start: 2019-01-01 | End: 2019-01-01

## 2019-01-01 RX ORDER — IPRATROPIUM BROMIDE AND ALBUTEROL SULFATE 2.5; .5 MG/3ML; MG/3ML
1 SOLUTION RESPIRATORY (INHALATION)
Status: DISCONTINUED | OUTPATIENT
Start: 2019-01-01 | End: 2019-01-01

## 2019-01-01 RX ORDER — MORPHINE SULFATE 4 MG/ML
4 INJECTION, SOLUTION INTRAMUSCULAR; INTRAVENOUS
Status: DISCONTINUED | OUTPATIENT
Start: 2019-01-01 | End: 2019-01-01 | Stop reason: HOSPADM

## 2019-01-01 RX ORDER — GLYCOPYRROLATE 0.2 MG/ML
0.2 INJECTION INTRAMUSCULAR; INTRAVENOUS EVERY 4 HOURS PRN
Status: DISCONTINUED | OUTPATIENT
Start: 2019-01-01 | End: 2019-01-01 | Stop reason: HOSPADM

## 2019-01-01 RX ORDER — DEXTROSE MONOHYDRATE 50 MG/ML
100 INJECTION, SOLUTION INTRAVENOUS PRN
Status: DISCONTINUED | OUTPATIENT
Start: 2019-01-01 | End: 2019-01-01 | Stop reason: SDUPTHER

## 2019-01-01 RX ORDER — NICOTINE POLACRILEX 4 MG
15 LOZENGE BUCCAL PRN
Status: DISCONTINUED | OUTPATIENT
Start: 2019-01-01 | End: 2019-01-01 | Stop reason: HOSPADM

## 2019-01-01 RX ORDER — 0.9 % SODIUM CHLORIDE 0.9 %
1000 INTRAVENOUS SOLUTION INTRAVENOUS ONCE
Status: COMPLETED | OUTPATIENT
Start: 2019-01-01 | End: 2019-01-01

## 2019-01-01 RX ORDER — INSULIN GLARGINE 100 [IU]/ML
10 INJECTION, SOLUTION SUBCUTANEOUS 2 TIMES DAILY
Status: DISCONTINUED | OUTPATIENT
Start: 2019-01-01 | End: 2019-01-01

## 2019-01-01 RX ORDER — ONDANSETRON 2 MG/ML
4 INJECTION INTRAMUSCULAR; INTRAVENOUS EVERY 6 HOURS PRN
Status: DISCONTINUED | OUTPATIENT
Start: 2019-01-01 | End: 2019-01-01

## 2019-01-01 RX ADMIN — MAGNESIUM HYDROXIDE 30 ML: 400 SUSPENSION ORAL at 17:15

## 2019-01-01 RX ADMIN — Medication 10 ML: at 10:27

## 2019-01-01 RX ADMIN — TRAMADOL HYDROCHLORIDE 50 MG: 50 TABLET, FILM COATED ORAL at 16:52

## 2019-01-01 RX ADMIN — VANCOMYCIN HYDROCHLORIDE 750 MG: 750 INJECTION, POWDER, LYOPHILIZED, FOR SOLUTION INTRAVENOUS at 10:24

## 2019-01-01 RX ADMIN — MEROPENEM 1 G: 1 INJECTION, POWDER, FOR SOLUTION INTRAVENOUS at 00:26

## 2019-01-01 RX ADMIN — CARBIDOPA AND LEVODOPA 1 TABLET: 25; 250 TABLET ORAL at 21:19

## 2019-01-01 RX ADMIN — METRONIDAZOLE 500 MG: 500 INJECTION, SOLUTION INTRAVENOUS at 01:59

## 2019-01-01 RX ADMIN — MEROPENEM 1 G: 1 INJECTION, POWDER, FOR SOLUTION INTRAVENOUS at 09:47

## 2019-01-01 RX ADMIN — HYDROXYUREA 500 MG: 500 CAPSULE ORAL at 15:00

## 2019-01-01 RX ADMIN — INSULIN LISPRO 10 UNITS: 100 INJECTION, SOLUTION INTRAVENOUS; SUBCUTANEOUS at 21:36

## 2019-01-01 RX ADMIN — OXYCODONE HYDROCHLORIDE AND ACETAMINOPHEN 1 TABLET: 5; 325 TABLET ORAL at 08:16

## 2019-01-01 RX ADMIN — ACETAMINOPHEN 650 MG: 325 TABLET ORAL at 21:19

## 2019-01-01 RX ADMIN — SODIUM CHLORIDE: 9 INJECTION, SOLUTION INTRAVENOUS at 21:19

## 2019-01-01 RX ADMIN — Medication 1 TABLET: at 08:17

## 2019-01-01 RX ADMIN — ATORVASTATIN CALCIUM 40 MG: 40 TABLET, FILM COATED ORAL at 08:46

## 2019-01-01 RX ADMIN — IPRATROPIUM BROMIDE 2 SPRAY: 21 SPRAY NASAL at 22:17

## 2019-01-01 RX ADMIN — IPRATROPIUM BROMIDE 2 SPRAY: 21 SPRAY NASAL at 20:00

## 2019-01-01 RX ADMIN — SENNOSIDES 8.6 MG: 8.6 TABLET, FILM COATED ORAL at 08:57

## 2019-01-01 RX ADMIN — INSULIN LISPRO 6 UNITS: 100 INJECTION, SOLUTION INTRAVENOUS; SUBCUTANEOUS at 10:27

## 2019-01-01 RX ADMIN — PROPRANOLOL HYDROCHLORIDE 20 MG: 10 TABLET ORAL at 08:17

## 2019-01-01 RX ADMIN — CARBIDOPA AND LEVODOPA 1 TABLET: 25; 250 TABLET ORAL at 10:07

## 2019-01-01 RX ADMIN — MEROPENEM 1 G: 1 INJECTION, POWDER, FOR SOLUTION INTRAVENOUS at 16:33

## 2019-01-01 RX ADMIN — INSULIN LISPRO 18 UNITS: 100 INJECTION, SOLUTION INTRAVENOUS; SUBCUTANEOUS at 11:55

## 2019-01-01 RX ADMIN — INSULIN LISPRO 4 UNITS: 100 INJECTION, SOLUTION INTRAVENOUS; SUBCUTANEOUS at 05:02

## 2019-01-01 RX ADMIN — ENOXAPARIN SODIUM 40 MG: 40 INJECTION SUBCUTANEOUS at 08:56

## 2019-01-01 RX ADMIN — TRAMADOL HYDROCHLORIDE 50 MG: 50 TABLET, FILM COATED ORAL at 08:51

## 2019-01-01 RX ADMIN — ONDANSETRON 4 MG: 2 INJECTION INTRAMUSCULAR; INTRAVENOUS at 00:48

## 2019-01-01 RX ADMIN — DULOXETINE HYDROCHLORIDE 30 MG: 30 CAPSULE, DELAYED RELEASE ORAL at 08:46

## 2019-01-01 RX ADMIN — Medication 10 ML: at 09:11

## 2019-01-01 RX ADMIN — SENNOSIDES 8.6 MG: 8.6 TABLET, FILM COATED ORAL at 10:07

## 2019-01-01 RX ADMIN — SODIUM CHLORIDE 1000 ML: 9 INJECTION, SOLUTION INTRAVENOUS at 22:00

## 2019-01-01 RX ADMIN — MEROPENEM 1 G: 1 INJECTION, POWDER, FOR SOLUTION INTRAVENOUS at 09:03

## 2019-01-01 RX ADMIN — INSULIN LISPRO 12 UNITS: 100 INJECTION, SOLUTION INTRAVENOUS; SUBCUTANEOUS at 16:47

## 2019-01-01 RX ADMIN — HYDROXYUREA 1000 MG: 500 CAPSULE ORAL at 08:17

## 2019-01-01 RX ADMIN — INSULIN LISPRO 12 UNITS: 100 INJECTION, SOLUTION INTRAVENOUS; SUBCUTANEOUS at 12:02

## 2019-01-01 RX ADMIN — SENNOSIDES 8.6 MG: 8.6 TABLET, FILM COATED ORAL at 08:45

## 2019-01-01 RX ADMIN — DULOXETINE HYDROCHLORIDE 30 MG: 30 CAPSULE, DELAYED RELEASE ORAL at 08:56

## 2019-01-01 RX ADMIN — MEROPENEM 1 G: 1 INJECTION, POWDER, FOR SOLUTION INTRAVENOUS at 08:17

## 2019-01-01 RX ADMIN — Medication 10 ML: at 22:00

## 2019-01-01 RX ADMIN — INSULIN LISPRO 4 UNITS: 100 INJECTION, SOLUTION INTRAVENOUS; SUBCUTANEOUS at 12:49

## 2019-01-01 RX ADMIN — INSULIN LISPRO 5 UNITS: 100 INJECTION, SOLUTION INTRAVENOUS; SUBCUTANEOUS at 21:57

## 2019-01-01 RX ADMIN — HYDROXYUREA 500 MG: 500 CAPSULE ORAL at 10:27

## 2019-01-01 RX ADMIN — MEROPENEM 1 G: 1 INJECTION, POWDER, FOR SOLUTION INTRAVENOUS at 16:58

## 2019-01-01 RX ADMIN — MORPHINE SULFATE 2 MG: 2 INJECTION, SOLUTION INTRAMUSCULAR; INTRAVENOUS at 11:45

## 2019-01-01 RX ADMIN — MORPHINE SULFATE 2 MG: 2 INJECTION, SOLUTION INTRAMUSCULAR; INTRAVENOUS at 09:18

## 2019-01-01 RX ADMIN — ACETAMINOPHEN 650 MG: 325 TABLET ORAL at 12:03

## 2019-01-01 RX ADMIN — MEROPENEM 1 G: 1 INJECTION, POWDER, FOR SOLUTION INTRAVENOUS at 16:45

## 2019-01-01 RX ADMIN — COLCHICINE 0.6 MG: 0.6 TABLET, FILM COATED ORAL at 20:57

## 2019-01-01 RX ADMIN — Medication 10 ML: at 09:24

## 2019-01-01 RX ADMIN — MEROPENEM 1 G: 1 INJECTION, POWDER, FOR SOLUTION INTRAVENOUS at 23:47

## 2019-01-01 RX ADMIN — ATORVASTATIN CALCIUM 40 MG: 40 TABLET, FILM COATED ORAL at 10:08

## 2019-01-01 RX ADMIN — INSULIN LISPRO 10 UNITS: 100 INJECTION, SOLUTION INTRAVENOUS; SUBCUTANEOUS at 17:15

## 2019-01-01 RX ADMIN — ATORVASTATIN CALCIUM 40 MG: 40 TABLET, FILM COATED ORAL at 08:56

## 2019-01-01 RX ADMIN — ASPIRIN 81 MG: 81 TABLET ORAL at 10:08

## 2019-01-01 RX ADMIN — INSULIN GLARGINE 10 UNITS: 100 INJECTION, SOLUTION SUBCUTANEOUS at 21:20

## 2019-01-01 RX ADMIN — INSULIN LISPRO 6 UNITS: 100 INJECTION, SOLUTION INTRAVENOUS; SUBCUTANEOUS at 18:33

## 2019-01-01 RX ADMIN — CELECOXIB 200 MG: 100 CAPSULE ORAL at 08:45

## 2019-01-01 RX ADMIN — HYDROXYUREA 1000 MG: 500 CAPSULE ORAL at 21:19

## 2019-01-01 RX ADMIN — PROPRANOLOL HYDROCHLORIDE 20 MG: 10 TABLET ORAL at 08:45

## 2019-01-01 RX ADMIN — INSULIN GLARGINE 10 UNITS: 100 INJECTION, SOLUTION SUBCUTANEOUS at 10:27

## 2019-01-01 RX ADMIN — MEROPENEM 1 G: 1 INJECTION, POWDER, FOR SOLUTION INTRAVENOUS at 00:30

## 2019-01-01 RX ADMIN — ENOXAPARIN SODIUM 40 MG: 40 INJECTION SUBCUTANEOUS at 10:07

## 2019-01-01 RX ADMIN — DEXTROSE MONOHYDRATE 12.5 G: 25 INJECTION, SOLUTION INTRAVENOUS at 04:06

## 2019-01-01 RX ADMIN — PANTOPRAZOLE SODIUM 40 MG: 40 INJECTION, POWDER, FOR SOLUTION INTRAVENOUS at 10:53

## 2019-01-01 RX ADMIN — IPRATROPIUM BROMIDE AND ALBUTEROL SULFATE 1 AMPULE: .5; 3 SOLUTION RESPIRATORY (INHALATION) at 19:21

## 2019-01-01 RX ADMIN — Medication 6 MILLICURIE: at 13:05

## 2019-01-01 RX ADMIN — INSULIN LISPRO 4 UNITS: 100 INJECTION, SOLUTION INTRAVENOUS; SUBCUTANEOUS at 17:01

## 2019-01-01 RX ADMIN — ASPIRIN 81 MG: 81 TABLET ORAL at 08:46

## 2019-01-01 RX ADMIN — CARBIDOPA AND LEVODOPA 1 TABLET: 25; 250 TABLET ORAL at 13:52

## 2019-01-01 RX ADMIN — PROPRANOLOL HYDROCHLORIDE 20 MG: 10 TABLET ORAL at 21:53

## 2019-01-01 RX ADMIN — MEROPENEM 1 G: 1 INJECTION, POWDER, FOR SOLUTION INTRAVENOUS at 08:43

## 2019-01-01 RX ADMIN — DEXTROSE MONOHYDRATE 12.5 G: 25 INJECTION, SOLUTION INTRAVENOUS at 06:25

## 2019-01-01 RX ADMIN — IPRATROPIUM BROMIDE 2 SPRAY: 21 SPRAY NASAL at 10:27

## 2019-01-01 RX ADMIN — Medication 10 ML: at 08:57

## 2019-01-01 RX ADMIN — SODIUM CHLORIDE: 9 INJECTION, SOLUTION INTRAVENOUS at 14:01

## 2019-01-01 RX ADMIN — PROPRANOLOL HYDROCHLORIDE 20 MG: 10 TABLET ORAL at 08:57

## 2019-01-01 RX ADMIN — Medication 10 ML: at 08:46

## 2019-01-01 RX ADMIN — INSULIN LISPRO 5 UNITS: 100 INJECTION, SOLUTION INTRAVENOUS; SUBCUTANEOUS at 08:58

## 2019-01-01 RX ADMIN — IOPAMIDOL 75 ML: 755 INJECTION, SOLUTION INTRAVENOUS at 22:22

## 2019-01-01 RX ADMIN — COLCHICINE 0.6 MG: 0.6 TABLET, FILM COATED ORAL at 10:07

## 2019-01-01 RX ADMIN — CELECOXIB 200 MG: 100 CAPSULE ORAL at 08:16

## 2019-01-01 RX ADMIN — CELECOXIB 200 MG: 100 CAPSULE ORAL at 10:07

## 2019-01-01 RX ADMIN — DULOXETINE HYDROCHLORIDE 30 MG: 30 CAPSULE, DELAYED RELEASE ORAL at 10:07

## 2019-01-01 RX ADMIN — MEROPENEM 1 G: 1 INJECTION, POWDER, FOR SOLUTION INTRAVENOUS at 23:34

## 2019-01-01 RX ADMIN — DULOXETINE HYDROCHLORIDE 30 MG: 30 CAPSULE, DELAYED RELEASE ORAL at 08:17

## 2019-01-01 RX ADMIN — INSULIN GLARGINE 10 UNITS: 100 INJECTION, SOLUTION SUBCUTANEOUS at 21:02

## 2019-01-01 RX ADMIN — IPRATROPIUM BROMIDE AND ALBUTEROL SULFATE 1 AMPULE: .5; 3 SOLUTION RESPIRATORY (INHALATION) at 07:07

## 2019-01-01 RX ADMIN — ENOXAPARIN SODIUM 40 MG: 40 INJECTION SUBCUTANEOUS at 08:46

## 2019-01-01 RX ADMIN — PROPRANOLOL HYDROCHLORIDE 20 MG: 10 TABLET ORAL at 20:57

## 2019-01-01 RX ADMIN — COLCHICINE 0.6 MG: 0.6 TABLET, FILM COATED ORAL at 21:53

## 2019-01-01 RX ADMIN — Medication 1 TABLET: at 08:56

## 2019-01-01 RX ADMIN — Medication 1 TABLET: at 08:45

## 2019-01-01 RX ADMIN — SENNOSIDES 8.6 MG: 8.6 TABLET, FILM COATED ORAL at 08:16

## 2019-01-01 RX ADMIN — INSULIN LISPRO 3 UNITS: 100 INJECTION, SOLUTION INTRAVENOUS; SUBCUTANEOUS at 21:02

## 2019-01-01 RX ADMIN — TRAMADOL HYDROCHLORIDE 50 MG: 50 TABLET, FILM COATED ORAL at 18:13

## 2019-01-01 RX ADMIN — IPRATROPIUM BROMIDE 2 SPRAY: 21 SPRAY NASAL at 08:59

## 2019-01-01 RX ADMIN — PROPRANOLOL HYDROCHLORIDE 20 MG: 10 TABLET ORAL at 10:07

## 2019-01-01 RX ADMIN — COLCHICINE 0.6 MG: 0.6 TABLET, FILM COATED ORAL at 21:19

## 2019-01-01 RX ADMIN — INSULIN GLARGINE 10 UNITS: 100 INJECTION, SOLUTION SUBCUTANEOUS at 11:51

## 2019-01-01 RX ADMIN — INSULIN LISPRO 2 UNITS: 100 INJECTION, SOLUTION INTRAVENOUS; SUBCUTANEOUS at 21:20

## 2019-01-01 RX ADMIN — CARBIDOPA AND LEVODOPA 1 TABLET: 25; 250 TABLET ORAL at 14:00

## 2019-01-01 RX ADMIN — Medication 1 TABLET: at 10:08

## 2019-01-01 RX ADMIN — IPRATROPIUM BROMIDE 2 SPRAY: 21 SPRAY NASAL at 09:09

## 2019-01-01 RX ADMIN — ASPIRIN 81 MG: 81 TABLET ORAL at 08:57

## 2019-01-01 RX ADMIN — OXYCODONE HYDROCHLORIDE AND ACETAMINOPHEN 1 TABLET: 5; 325 TABLET ORAL at 01:38

## 2019-01-01 RX ADMIN — COLCHICINE 0.6 MG: 0.6 TABLET, FILM COATED ORAL at 08:45

## 2019-01-01 RX ADMIN — INSULIN LISPRO 10 UNITS: 100 INJECTION, SOLUTION INTRAVENOUS; SUBCUTANEOUS at 08:59

## 2019-01-01 RX ADMIN — MEROPENEM 1 G: 1 INJECTION, POWDER, FOR SOLUTION INTRAVENOUS at 09:04

## 2019-01-01 RX ADMIN — IPRATROPIUM BROMIDE 2 SPRAY: 21 SPRAY NASAL at 21:38

## 2019-01-01 RX ADMIN — VANCOMYCIN HYDROCHLORIDE 750 MG: 750 INJECTION, POWDER, LYOPHILIZED, FOR SOLUTION INTRAVENOUS at 13:57

## 2019-01-01 RX ADMIN — CELECOXIB 200 MG: 100 CAPSULE ORAL at 08:57

## 2019-01-01 RX ADMIN — IPRATROPIUM BROMIDE AND ALBUTEROL SULFATE 1 AMPULE: .5; 3 SOLUTION RESPIRATORY (INHALATION) at 15:57

## 2019-01-01 RX ADMIN — ATORVASTATIN CALCIUM 40 MG: 40 TABLET, FILM COATED ORAL at 08:17

## 2019-01-01 RX ADMIN — DEXTROSE MONOHYDRATE 100 ML/HR: 50 INJECTION, SOLUTION INTRAVENOUS at 04:36

## 2019-01-01 RX ADMIN — IPRATROPIUM BROMIDE 2 SPRAY: 21 SPRAY NASAL at 21:59

## 2019-01-01 RX ADMIN — DEXTROSE MONOHYDRATE 12.5 G: 25 INJECTION, SOLUTION INTRAVENOUS at 01:54

## 2019-01-01 RX ADMIN — CARBIDOPA AND LEVODOPA 1 TABLET: 25; 250 TABLET ORAL at 20:58

## 2019-01-01 RX ADMIN — MEROPENEM 1 G: 1 INJECTION, POWDER, FOR SOLUTION INTRAVENOUS at 23:22

## 2019-01-01 RX ADMIN — MAGNESIUM SULFATE HEPTAHYDRATE 2 G: 40 INJECTION, SOLUTION INTRAVENOUS at 23:59

## 2019-01-01 RX ADMIN — SODIUM CHLORIDE: 900 INJECTION, SOLUTION INTRAVENOUS at 01:59

## 2019-01-01 RX ADMIN — MEROPENEM 1 G: 1 INJECTION, POWDER, FOR SOLUTION INTRAVENOUS at 16:14

## 2019-01-01 RX ADMIN — PANTOPRAZOLE SODIUM 40 MG: 40 INJECTION, POWDER, FOR SOLUTION INTRAVENOUS at 10:08

## 2019-01-01 RX ADMIN — TRAMADOL HYDROCHLORIDE 50 MG: 50 TABLET, FILM COATED ORAL at 09:58

## 2019-01-01 RX ADMIN — Medication 10 ML: at 20:57

## 2019-01-01 RX ADMIN — SODIUM CHLORIDE: 9 INJECTION, SOLUTION INTRAVENOUS at 13:55

## 2019-01-01 ASSESSMENT — PAIN SCALES - GENERAL
PAINLEVEL_OUTOF10: 0
PAINLEVEL_OUTOF10: 3
PAINLEVEL_OUTOF10: 0
PAINLEVEL_OUTOF10: 0
PAINLEVEL_OUTOF10: 6
PAINLEVEL_OUTOF10: 6
PAINLEVEL_OUTOF10: 0
PAINLEVEL_OUTOF10: 5
PAINLEVEL_OUTOF10: 8
PAINLEVEL_OUTOF10: 7
PAINLEVEL_OUTOF10: 0
PAINLEVEL_OUTOF10: 0
PAINLEVEL_OUTOF10: 5
PAINLEVEL_OUTOF10: 0
PAINLEVEL_OUTOF10: 6
PAINLEVEL_OUTOF10: 3
PAINLEVEL_OUTOF10: 6
PAINLEVEL_OUTOF10: 6
PAINLEVEL_OUTOF10: 4
PAINLEVEL_OUTOF10: 6
PAINLEVEL_OUTOF10: 4
PAINLEVEL_OUTOF10: 7
PAINLEVEL_OUTOF10: 0
PAINLEVEL_OUTOF10: 4
PAINLEVEL_OUTOF10: 0
PAINLEVEL_OUTOF10: 6
PAINLEVEL_OUTOF10: 4
PAINLEVEL_OUTOF10: 7
PAINLEVEL_OUTOF10: 0
PAINLEVEL_OUTOF10: 3
PAINLEVEL_OUTOF10: 6
PAINLEVEL_OUTOF10: 0
PAINLEVEL_OUTOF10: 0

## 2019-07-24 PROBLEM — R10.9 BELLY PAIN: Status: ACTIVE | Noted: 2019-01-01

## 2019-07-25 PROBLEM — E44.0 MODERATE MALNUTRITION (HCC): Chronic | Status: ACTIVE | Noted: 2019-01-01

## 2019-07-25 PROBLEM — K81.0 ACUTE CHOLECYSTITIS: Status: ACTIVE | Noted: 2019-01-01

## 2019-07-25 NOTE — H&P
bilaterally without Rales/Wheezes/Rhonchi. \"}  Cardiovascular:  {TP Cardio:70739::\"Regular rate and rhythm with normal S1/S2 without murmurs, rubs or gallops. \"}  Abdomen: {TP Bowel:60490::\"Soft, non-tender, non-distended with normal bowel sounds. \"}  Musculoskeletal: {TP Musculoskelatal:58410::\"No clubbing, cyanosis or edema bilaterally. Full range of motion without deformity. \"}   Peripheral Pulses: +2 palpable, equal bilaterally   Skin: Skin color, texture, turgor normal.  No rashes or lesions. Neurologic:  Neurovascularly intact without any focal sensory/motor deficits. Cranial nerves: II-XII intact, grossly non-focal.  Psychiatric:  Alert and oriented, thought content appropriate, normal insight  Capillary Refill: Brisk,< 3 seconds         Labs:     Recent Labs     07/24/19 2049   WBC 66.3*   HGB 9.5*   HCT 30.9*        Recent Labs     07/24/19 2049   *   K 4.6   CL 94*   CO2 25   BUN 17   CREATININE 0.7*   CALCIUM 9.0     Recent Labs     07/24/19 2049   AST 35   ALT 12   BILITOT 0.4   ALKPHOS 301*     No results for input(s): INR in the last 72 hours. Recent Labs     07/24/19 2049   TROPONINI <0.01       Urinalysis:      Lab Results   Component Value Date    NITRU Negative 07/24/2019    WBCUA 3-5 07/24/2019    BACTERIA 2+ 07/24/2019    RBCUA 0-2 07/24/2019    BLOODU Negative 07/24/2019    SPECGRAV 1.025 07/24/2019    GLUCOSEU 100 07/24/2019    GLUCOSEU >=1000 05/12/2012       Radiology:     CXR: I have reviewed the CXR with the following interpretation: ***  EKG:  I have reviewed the EKG with the following interpretation: ***    US GALLBLADDER RUQ   Final Result   Sludge and multiple small stones in the neck of the gallbladder associated   with gallbladder wall thickening to 7 mm. Findings are concerning for   possible acute cholecystitis. Suggest HIDA scan. CTA CHEST ABDOMEN PELVIS W CONTRAST   Final Result      CTA PELVIS:      Pelvic branches of aorta are patent.  Urinary bladder is Provider, MD       Allergies:  Patient has no known allergies. Social History:      The patient currently lives ***    TOBACCO:   reports that he has been smoking. He has smoked for the past 50.00 years. He has never used smokeless tobacco.  ETOH:   reports that he drinks alcohol. Family History:      *** Reviewed in detail and negative for DM, CAD, Cancer, CVA. Positive as follows:        Problem Relation Age of Onset    Cancer Mother         colon    Diabetes Mother     Heart Disease Father     High Blood Pressure Father     Cancer Brother         colon       REVIEW OF SYSTEMS:   All twelve systems reviewed and negative except for noted in HPI. PHYSICAL EXAM PERFORMED:    /67   Pulse 94   Temp 100.5 °F (38.1 °C) (Oral)   Resp 20   Ht 5' 8\" (1.727 m)   Wt 145 lb (65.8 kg)   SpO2 99%   BMI 22.05 kg/m²     General appearance:  No apparent distress, appears stated age and cooperative. HEENT:  Normal cephalic, atraumatic without obvious deformity. Pupils equal, round, and reactive to light. Extra ocular muscles intact. Conjunctivae/corneas clear. Neck: Supple, with full range of motion. No jugular venous distention. Trachea midline. Respiratory:  {TP Respiratory:11421::\"Normal respiratory effort. Clear to auscultation, bilaterally without Rales/Wheezes/Rhonchi. \"}  Cardiovascular:  {TP Cardio:77275::\"Regular rate and rhythm with normal S1/S2 without murmurs, rubs or gallops. \"}  Abdomen: {TP Bowel:67558::\"Soft, non-tender, non-distended with normal bowel sounds. \"}  Musculoskeletal: {TP Musculoskelatal:02158::\"No clubbing, cyanosis or edema bilaterally. Full range of motion without deformity. \"}   Peripheral Pulses: +2 palpable, equal bilaterally   Skin: Skin color, texture, turgor normal.  No rashes or lesions. Neurologic:  Neurovascularly intact without any focal sensory/motor deficits.  Cranial nerves: II-XII intact, grossly non-focal.  Psychiatric:  Alert and oriented, thought content

## 2019-07-25 NOTE — CONSULTS
Gastroenterology Consult Note    Patient:   Nisa Desouza   YOB: 1930   Facility:   [facility]   Referring/PCP: No primary care provider on file. Date:     7/25/2019  Consultant:   Kaylah Pedersen MD    Subjective: This 80 y.o. male was admitted 7/24/2019 with a diagnosis of \"Belly pain [R10.9]  Belly pain [R10.9]\" and is seen in consultation regarding \"LUQ pain\". Information was obtained from interview of  the patient, examination of the patient, and review of records. I did  update the past medical, surgical, social and / or family history. abd pain LUQ moderate for days assoc w N/V    Current status  Present  Diet Order: Diet NPO, After Midnight Exceptions are: Ice Chips, Sips with Meds  DIET FULL LIQUID; and he is tolerating diet. Recently, he has experienced moderate, maximum 6/10 LUQ abdominal  Pain and he has required Intravenous narcotic analgesics. The patient has also experienced no constipation, diarrhea, fever, hematochezia and melena      Prior to Admission medications    Medication Sig Start Date End Date Taking?  Authorizing Provider   MIDODRINE HCL PO Take by mouth    Historical Provider, MD   hydroxyurea (HYDREA) 500 MG chemo capsule Take 2 capsules by mouth daily 7/7/17   Melina Guthrie MD   insulin detemir (LEVEMIR) 100 UNIT/ML injection pen Inject 8 Units into the skin 2 times daily     Historical Provider, MD   atorvastatin (LIPITOR) 40 MG tablet Take 40 mg by mouth 12/16/16   Historical Provider, MD   ipratropium (ATROVENT) 0.03 % nasal spray 2 sprays by Nasal route 2/27/17   Historical Provider, MD   rivaroxaban (XARELTO) 15 MG TABS tablet Take 20 mg by mouth  12/16/16   Historical Provider, MD   senna (SENOKOT) 8.6 MG TABS tablet Take 1 tablet by mouth 12/16/16   Historical Provider, MD   zolpidem (AMBIEN) 5 MG tablet TAKE 1 TABLET BY MOUTH EVERY DAY AT BEDTIME. 2/20/17   Historical Provider, MD   celecoxib (CELEBREX) 200 MG capsule Take 200 mg by mouth daily. Historical Provider, MD   duloxetine (CYMBALTA) 30 MG capsule Take 30 mg by mouth daily. Historical Provider, MD   tramadol (ULTRAM) 50 MG tablet Take 50 mg by mouth 2 times daily as needed. Historical Provider, MD   propranolol (INDERAL) 20 MG tablet Take 20 mg by mouth 2 times daily. Historical Provider, MD   glyBURIDE-metformin (GLUCOVANCE) 5-500 MG per tablet Take 2 tablets by mouth 2 times daily (with meals). Historical Provider, MD   therapeutic multivitamin-minerals (THERAGRAN-M) tablet Take 1 tablet by mouth daily. Historical Provider, MD   pravastatin (PRAVACHOL) 40 MG tablet Take 40 mg by mouth daily. Historical Provider, MD   sitagliptan (JANUVIA) 100 MG tablet Take 100 mg by mouth daily.  Takes with lunch    Historical Provider, MD      Scheduled Medications:    ipratropium  2 spray Nasal BID    therapeutic multivitamin-minerals  1 tablet Oral Daily    senna  1 tablet Oral Daily    propranolol  20 mg Oral BID    DULoxetine  30 mg Oral Daily    celecoxib  200 mg Oral Daily    atorvastatin  40 mg Oral Daily    sodium chloride flush  10 mL Intravenous 2 times per day    meropenem  1 g Intravenous Q8H    aspirin  81 mg Oral Daily    enoxaparin  40 mg Subcutaneous Daily    [START ON 7/26/2019] hydroxyurea  1,000 mg Oral Every Other Day    insulin lispro  0-12 Units Subcutaneous Q4H     Infusions:    sodium chloride Stopped (07/25/19 0908)    dextrose      sodium chloride Stopped (07/25/19 1246)     PRN Medications: zolpidem, traMADol, sodium chloride flush, magnesium hydroxide, ondansetron, potassium chloride **OR** potassium alternative oral replacement **OR** potassium chloride, magnesium sulfate, acetaminophen, morphine, oxyCODONE-acetaminophen, dextrose, dextrose, glucose, glucagon (rDNA)  Allergies: No Known Allergies    Past Medical History:   Diagnosis Date    Arthritis     CAD (coronary artery disease)     Diabetes mellitus ([facility] [address]     Hyperlipidemia     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      Past Surgical History:   Procedure Laterality Date    COLONOSCOPY  11    CORONARY ANGIOPLASTY WITH STENT PLACEMENT      KNEE SURGERY      right    PACEMAKER INSERTION      TONSILLECTOMY         Social:   Social History     Tobacco Use    Smoking status: Current Some Day Smoker     Years: 50.00    Smokeless tobacco: Never Used    Tobacco comment: cigarsn 3 per week   Substance Use Topics    Alcohol use: Yes     Alcohol/week: 0.0 standard drinks     Types: 1 - 2 Shots of liquor per week     Comment: per day     Family:   Family History   Problem Relation Age of Onset    Cancer Mother         colon    Diabetes Mother     Heart Disease Father     High Blood Pressure Father     Cancer Brother         colon       ROS: Pertinent items are noted in HPI.     Objective:   Vital Signs:  Temp (24hrs), Av.9 °F (37.2 °C), Min:97.6 °F (36.4 °C), Max:100.5 °F (35.0 °C)     Systolic (05CIO), XPS:273 , Min:120 , GJI:155      Diastolic (34UCA), MLF:08, Min:55, Max:79     Pulse  Av.4  Min: 84  Max: 100  /68   Pulse 84   Temp 97.6 °F (36.4 °C) (Oral)   Resp 16   Ht 5' 8\" (1.727 m)   Wt 136 lb 11.2 oz (62 kg)   SpO2 92%   BMI 20.79 kg/m²      Physical Exam:   /68   Pulse 84   Temp 97.6 °F (36.4 °C) (Oral)   Resp 16   Ht 5' 8\" (1.727 m)   Wt 136 lb 11.2 oz (62 kg)   SpO2 92%   BMI 20.79 kg/m²   General appearance: alert, appears stated age and cooperative  Lungs: clear to auscultation bilaterally  Chest wall: no tenderness  Heart: regular rate and rhythm, S1, S2 normal, no murmur, click, rub or gallop  Abdomen: soft, non-tender; bowel sounds normal; no masses,  no organomegaly  Extremities: extremities normal, atraumatic, no cyanosis or edema  Skin: Skin color, texture, turgor normal. No rashes or lesions  Neurologic: Grossly normal    Lab and Imaging Review

## 2019-07-25 NOTE — PROGRESS NOTES
Nucular med called and told RN to feed pt a snack before pt goes for HIDA scan around 12/1. Armando crackers and peanut butter given.  Also told to hold narcotics at this time until after test.

## 2019-07-26 NOTE — DISCHARGE INSTR - COC
Thrombocytosis (HCC) D47.3    CKD (chronic kidney disease) stage 3, GFR 30-59 ml/min (HCC) N18.3    Myeloproliferative disorder (HCC) D47.1    Encounter for long-term current use of high risk medication Z79.899    Belly pain R10.9    Acute cholecystitis K81.0    Moderate malnutrition (HCC) E44.0       Isolation/Infection:   Isolation          No Isolation            Nurse Assessment:  Last Vital Signs: BP (!) 144/69   Pulse 90   Temp 97.9 °F (36.6 °C) (Oral)   Resp 16   Ht 5' 8\" (1.727 m)   Wt 136 lb 11.2 oz (62 kg)   SpO2 92%   BMI 20.79 kg/m²     Last documented pain score (0-10 scale): Pain Level: 3  Last Weight:   Wt Readings from Last 1 Encounters:   07/25/19 136 lb 11.2 oz (62 kg)     Mental Status:  {IP PT MENTAL STATUS:91752}    IV Access:  { AMBERLY IV ACCESS:949306658}    Nursing Mobility/ADLs:  Walking   {P DME PNFC:363266691}  Transfer  {[facility] DME KFVE:888041930}  Bathing  {P DME FDLJ:259864598}  Dressing  {P DME OJKN:449304971}  Toileting  {P DME GEUN:523150477}  Feeding  {[facility] DME HCCK:986978967}  Med Admin  {P DME TTQR:664928139}  Med Delivery   { AMBERLY MED Delivery:352278687}    Wound Care Documentation and Therapy:  Incision 05/10/12 Back Lower; Posterior (Active)   Number of days: 2632        Elimination:  Continence:   · Bowel: {YES / YM:50182}  · Bladder: {YES / XJ:68307}  Urinary Catheter: {Urinary Catheter:812348963}   Colostomy/Ileostomy/Ileal Conduit: {YES / KJ:84320}       Date of Last BM: ***    Intake/Output Summary (Last 24 hours) at 7/26/2019 1617  Last data filed at 7/26/2019 1500  Gross per 24 hour   Intake 1732.8 ml   Output 225 ml   Net 1507.8 ml     I/O last 3 completed shifts: In: 1732.8 [P.O.:240;  I.V.:1492.8]  Out: 225 [Urine:225]    Safety Concerns:     [address] Safety Concerns:388455812}    Impairments/Disabilities:      [address] Impairments/Disabilities:628187706}    Nutrition Therapy:  Current Nutrition Therapy:   [address] Diet List:889727267}    Routes of Feeding: {CHP DME Other Feedings:946255876}  Liquids: {Slp liquid thickness:93550}  Daily Fluid Restriction: {CHP DME Yes amt example:928550231}  Last Modified Barium Swallow with Video (Video Swallowing Test): {Done Not Done HMID:009041884}    Treatments at the Time of Hospital Discharge:   Respiratory Treatments: ***  Oxygen Therapy:  {Therapy; copd oxygen:67120}  Ventilator:    {[facility] Vent TGR}    Rehab Therapies: {THERAPEUTIC INTERVENTION:7681368267}  Weight Bearing Status/Restrictions: {[facility] Weight Bearin}  Other Medical Equipment (for information only, NOT a DME order):  {EQUIPMENT:249889650}  Other Treatments: ***    Patient's personal belongings (please select all that are sent with patient):  {CHP DME Belongings:772284753}    RN SIGNATURE:  {Esignature:364583513}    CASE MANAGEMENT/SOCIAL WORK SECTION    Inpatient Status Date: ***    Readmission Risk Assessment Score:  Readmission Risk              Risk of Unplanned Readmission:        13           Discharging to Facility/ Agency   · Name:   · Address:  · Phone:  · Fax:    Dialysis Facility (if applicable)   · Name:  · Address:  · Dialysis Schedule:  · Phone:  · Fax:    / signature: {Esignature:951628054}    PHYSICIAN SECTION    Prognosis: Poor    Condition at Discharge: Terminal    Rehab Potential (if transferring to Rehab): Poor  Recommended Follow-up, Labs or Other Treatments After Discharge:    hospice               Physician Certification: I certify the above information and transfer of Rachel Pineda  is necessary for the continuing treatment of the diagnosis listed and that he requires Hospice for less 30 days.      Update Admission H&P: No change in H&P    PHYSICIAN SIGNATURE:  Electronically signed by April Kay MD on 19 at 11:39 AM

## 2019-07-26 NOTE — H&P
Pre-sedation Assessment    History and Physical / Pre-Sedation Assessment  Patient:  Lon Foy   :   10/7/1930     Intended Procedure: EGD      HPI: 80 Y M with DM2, CAD, IPF, and CLL presents with Lt epigastric/LUQ and Lt anterior lower chest pain, anorexia, and N/V for the last few days. Lipase is 169 and USG revealed gallstones. Possibilities are mild biliary pancreatitis vs UGI etiology.     Plan:   1. Supportive care   2. Consider PPI  3.  EGD     Current Facility-Administered Medications   Medication Dose Route Frequency Provider Last Rate Last Dose    [START ON 2019] hydroxyurea (HYDREA) chemo capsule 500 mg  500 mg Oral Every Other Day Patricia Clark APRN - [facility]        ipratropium (ATROVENT) 0.03 % nasal spray 2 spray  2 spray Nasal BID Bam Tyler MD   2 spray at 19 0909    zolpidem (AMBIEN) tablet 5 mg  5 mg Oral Nightly PRN Bam Tyler MD        traMADol (ULTRAM) tablet 50 mg  50 mg Oral Q6H PRN Bam Tyler MD        therapeutic multivitamin-minerals 1 tablet  1 tablet Oral Daily Bam Tyler MD   1 tablet at 19 0817    senna (SENOKOT) tablet 8.6 mg  1 tablet Oral Daily Bam Tyler MD   8.6 mg at 19 0816    propranolol (INDERAL) tablet 20 mg  20 mg Oral BID Bam Tyler MD   20 mg at 19 0817    DULoxetine (CYMBALTA) extended release capsule 30 mg  30 mg Oral Daily Bam Tyler MD   30 mg at 19 0817    celecoxib (CELEBREX) capsule 200 mg  200 mg Oral Daily Bam Tyler MD   200 mg at 19 0816    atorvastatin (LIPITOR) tablet 40 mg  40 mg Oral Daily Bam Tyler MD   40 mg at 19 0817    sodium chloride flush 0.9 % injection 10 mL  10 mL Intravenous 2 times per day Bam Tyler MD   10 mL at 19 0911    sodium chloride flush 0.9 % injection 10 mL  10 mL Intravenous PRN Bam Tyler MD        magnesium hydroxide (MILK OF MAGNESIA) 400 MG/5ML suspension

## 2019-07-26 NOTE — CONSULTS
Department of General Surgery Consult    PATIENT NAME: Low Arroyo   YOB: 1930    ADMISSION DATE: 7/24/2019  8:20 PM      TODAY'S DATE: 7/26/2019    Reason for Consult:  Possible cholecystitis    Chief Complaint: Loss of appetite, LUQ abdominal pain    Requesting Physician:  Ray Eason    HISTORY OF PRESENT ILLNESS:              The patient is a 80 y.o. male who presents with several day h/o increasing anorexia, nausea, and LUQ/epigastric pain. Denied RUQ pain on admission. Workup in ED, however, suggested acute calculous cholecystitis by US. Admitted, NPO, IV abx. Today pt states he no longer has pain, and he is hungry. HIDA done yesterday, however, suggests acute cholecystitis. Seen by GI, they plan for EGD today, presumably due to the atypical location of his pain for a biliary process.     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        Past Surgical History:        Procedure Laterality Date    COLONOSCOPY  7/20/11    CORONARY ANGIOPLASTY WITH STENT PLACEMENT      KNEE SURGERY  1980    right    PACEMAKER INSERTION      TONSILLECTOMY         Current Medications:   Current Facility-Administered Medications: ipratropium (ATROVENT) 0.03 % nasal spray 2 spray, 2 spray, Nasal, BID  zolpidem (AMBIEN) tablet 5 mg, 5 mg, Oral, Nightly PRN  traMADol (ULTRAM) tablet 50 mg, 50 mg, Oral, Q6H PRN  therapeutic multivitamin-minerals 1 tablet, 1 tablet, Oral, Daily  senna (SENOKOT) tablet 8.6 mg, 1 tablet, Oral, Daily  propranolol (INDERAL) tablet 20 mg, 20 mg, Oral, BID  DULoxetine (CYMBALTA) extended release capsule 30 mg, 30 mg, Oral, Daily  celecoxib (CELEBREX) capsule 200 mg, 200 mg, Oral, Daily  atorvastatin (LIPITOR) tablet 40 mg, 40 mg, Oral, Daily  sodium chloride flush 0.9 % injection 10 mL, 10 mL, Intravenous, 2 times per day  sodium chloride flush 0.9 % injection 10 mL, 10 mL, Intravenous, PRN  magnesium hydroxide (MILK OF MAGNESIA) 400 MG/5ML suspension 30 mL, 30 mL, Oral, Daily PRN  ondansetron (ZOFRAN) injection 4 mg, 4 mg, Intravenous, Q6H PRN  potassium chloride (KLOR-CON M) extended release tablet 40 mEq, 40 mEq, Oral, PRN **OR** potassium bicarb-citric acid (EFFER-K) effervescent tablet 40 mEq, 40 mEq, Oral, PRN **OR** potassium chloride 10 mEq/100 mL IVPB (Peripheral Line), 10 mEq, Intravenous, PRN  magnesium sulfate 1 g in dextrose 5% 100 mL IVPB, 1 g, Intravenous, PRN  acetaminophen (TYLENOL) tablet 650 mg, 650 mg, Oral, Q4H PRN  morphine (PF) injection 2 mg, 2 mg, Intravenous, Q4H PRN  oxyCODONE-acetaminophen (PERCOCET) 5-325 MG per tablet 1 tablet, 1 tablet, Oral, Q4H PRN  meropenem (MERREM) 1 g in sodium chloride 0.9 % 100 mL IVPB (mini-bag), 1 g, Intravenous, Q8H  0.9 % sodium chloride infusion, , Intravenous, Continuous  aspirin EC tablet 81 mg, 81 mg, Oral, Daily  dextrose 50 % IV solution, 12.5 g, Intravenous, PRN  dextrose 5 % solution, 100 mL/hr, Intravenous, PRN  enoxaparin (LOVENOX) injection 40 mg, 40 mg, Subcutaneous, Daily  hydroxyurea (HYDREA) chemo capsule 1,000 mg, 1,000 mg, Oral, Every Other Day  glucose (GLUTOSE) 40 % oral gel 15 g, 15 g, Oral, PRN  glucagon (rDNA) injection 1 mg, 1 mg, Intramuscular, PRN  insulin lispro (HUMALOG) injection vial 0-12 Units, 0-12 Units, Subcutaneous, Q4H  Prior to Admission medications    Medication Sig Start Date End Date Taking?  Authorizing Provider   MIDODRINE HCL PO Take by mouth    Historical Provider, MD   hydroxyurea (HYDREA) 500 MG chemo capsule Take 2 capsules by mouth daily 7/7/17   Taylor Boas, MD   insulin detemir (LEVEMIR) 100 UNIT/ML injection pen Inject 8 Units into the skin 2 times daily     Historical Provider, MD   atorvastatin (LIPITOR) 40 MG tablet Take 40 mg by mouth 12/16/16   Historical Provider, MD   ipratropium (ATROVENT) 0.03 % nasal spray 2 sprays by Nasal route 2/27/17   Historical Provider, MD   rivaroxaban (XARELTO) 15 MG TABS tablet Take 20 mg by mouth  12/16/16   Historical Provider, MD   senna (SENOKOT) 8.6 MG TABS tablet Take 1 tablet by mouth 12/16/16   Historical Provider, MD   zolpidem (AMBIEN) 5 MG tablet TAKE 1 TABLET BY MOUTH EVERY DAY AT BEDTIME. 2/20/17   Historical Provider, MD   celecoxib (CELEBREX) 200 MG capsule Take 200 mg by mouth daily. Historical Provider, MD   duloxetine (CYMBALTA) 30 MG capsule Take 30 mg by mouth daily. Historical Provider, MD   tramadol (ULTRAM) 50 MG tablet Take 50 mg by mouth 2 times daily as needed. Historical Provider, MD   propranolol (INDERAL) 20 MG tablet Take 20 mg by mouth 2 times daily. Historical Provider, MD   glyBURIDE-metformin (GLUCOVANCE) 5-500 MG per tablet Take 2 tablets by mouth 2 times daily (with meals). Historical Provider, MD   therapeutic multivitamin-minerals (THERAGRAN-M) tablet Take 1 tablet by mouth daily. Historical Provider, MD   pravastatin (PRAVACHOL) 40 MG tablet Take 40 mg by mouth daily. Historical Provider, MD   sitagliptan (JANUVIA) 100 MG tablet Take 100 mg by mouth daily. Takes with lunch    Historical Provider, MD        Allergies:  Patient has no known allergies. Social History:   TOBACCO:   reports that he has been smoking. He has smoked for the past 50.00 years. He has never used smokeless tobacco.  ETOH:   reports that he drinks alcohol. DRUGS:   reports that he does not use drugs.   OCCUPATION:  Retired  Patient currently lives      Family History:        Problem Relation Age of Onset    Cancer Mother         colon    Diabetes Mother     Heart Disease Father     High Blood Pressure Father     Cancer Brother         colon       REVIEW OF SYSTEMS:  CONSTITUTIONAL:  positive for  anorexia  HEENT:  negative  RESPIRATORY:  negative  CARDIOVASCULAR:  negative  GASTROINTESTINAL:  negative except for nausea and abdominal pain  GENITOURINARY:  negative  HEMATOLOGIC/LYMPHATIC:  negative  NEUROLOGICAL:  Negative  * All other ROS reviewed and negative. PHYSICAL EXAM:  VITALS:  BP (!) 150/73   Pulse 97   Temp 99.2 °F (37.3 °C) (Oral)   Resp 16   Ht 5' 8\" (1.727 m)   Wt 136 lb 11.2 oz (62 kg)   SpO2 98%   BMI 20.79 kg/m²   24HR INTAKE/OUTPUT:    I/O last 3 completed shifts: In: 1732.8 [P.O.:240; I.V.:1492.8]  Out: 100 [Urine:100]  No intake/output data recorded. CONSTITUTIONAL:  alert, no apparent distress and normal weight  EYES:  PERRL, sclera clear  ENT:  Normocepalic,atraumatic, without obvious abnormality  NECK:  supple, symmetrical, trachea midline  LUNGS: Resp effort easy and unlabored,    CARDIOVASCULAR:  NO JVD,   and    ABDOMEN:  no scars, hypoactive bowel sounds, soft, non-distended, non-tender, involuntary guarding absent, no masses palpated and    MUSCULOSKELETAL: No clubbing or cyanosis, 0+ pitting edema lower extremities  NEUROLOGIC:  Mental Status Exam:  Level of Alertness:   awake  PSYCHIATRIC:   person, place, time  SKIN:  normal skin color, texture, turgor and no jaundice    DATA:    CBC:   Recent Labs     07/24/19 2049 07/25/19  0838   WBC 66.3* 68.4*   HGB 9.5* 8.9*   HCT 30.9* 28.4*    449     BMP:    Recent Labs     07/24/19 2049 07/25/19  0838   * 131*   K 4.6 4.6   CL 94* 95*   CO2 25 22   BUN 17 18   CREATININE 0.7* 0.7*   GLUCOSE 312* 356*     Hepatic:   Recent Labs     07/24/19 2049 07/25/19  0838   AST 35 27   ALT 12 36   BILITOT 0.4 0.4   ALKPHOS 301* 275*     Mag:      Recent Labs     07/24/19 2049   MG 1.40*      Phos:   No results for input(s): PHOS in the last 72 hours. INR: No results for input(s): INR in the last 72 hours. Radiology Review: Images personally reviewed by me.    RIGHT UPPER QUADRANT ULTRASOUND       7/24/2019 10:47 pm       COMPARISON:   Abdomen and pelvis CT obtained earlier on the same day       HISTORY:   [address] the exam consistent with   cholecystitis.  Correlate with acuity of symptoms.               IMPRESSION/RECOMMENDATIONS:    Acute cholecystitis, currently resolving, no ongoing symptoms at this time.     - GI plans EGD today, await results   - ok to advance to low fat diet after EGD   - if pt tolerates diet, would d/c home and can f/u with me in office to discuss possible elective lap cholecystectomy    Discussed these issues in detail w/ patient. He appears to understand, asks appropriate questions, and agrees with the plan.     Electronically signed by Coco Cummings MD     [address] [facility]  [id number]

## 2019-07-26 NOTE — CONSULTS
[facility]    Consult Note      Requesting Physician:  Vince Álvraez CNP     CHIEF COMPLAINT:  MPD and blasts on smear, admitted with abd pain and gallstones     HISTORY OF PRESENT ILLNESS:      Mr. To Higgins  is a 80 y.o. male we are seeing in consultation for MPD. Differential shows 10 percent blasts. He was on Hydrea 500 every other day for platelet control. He was seen in the office on 7/22 and WBC was 37. His baseline WBC is 11. WBC today is 77. Hgb 8.5. EGD with duodenal ulcer. 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 He last received Procrit 60,000 units on 7/22, he gets a shot every 2 weeks. He presented to the ED with abdominal pain and HIDA scan with acute cholecystitis. He is on a low fiber diet. He is seen in ENDO suite. No family present. Patient is aware of his need for bone marrow biopsy to rule out leukemia. Patient hopes to dc this weekend. NO pain. Temp on admit was 100.5. Blood cx NGTD. No longer having fevers, now on Merrem.        Past Medical History:    Past Medical History:   Diagnosis Date    Arthritis     CAD (coronary artery disease)     Diabetes mellitus ([address]     Hyperlipidemia     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      Past Surgical History:    Past Surgical History:   Procedure Laterality Date    COLONOSCOPY  7/20/11    CORONARY ANGIOPLASTY WITH STENT PLACEMENT      KNEE SURGERY  1980    right    PACEMAKER INSERTION      TONSILLECTOMY         Current Medications:    Current Facility-Administered Medications   Medication Dose Route Frequency Provider Last Rate Last Dose    [START ON 7/28/2019] hydroxyurea (HYDREA) chemo capsule 500 mg  500 mg Oral Every Other Day Tony Smoke, APRN - CNP        insulin lispro (HUMALOG) injection vial 0-12 Units  0-12 Units Subcutaneous TID  Janeth Scott, APRN - CNP        insulin lispro (HUMALOG) injection vial Intravenous PRN Celio Almanza MD        dextrose 5 % solution  100 mL/hr Intravenous PRN Celio Almanza MD        enoxaparin (LOVENOX) injection 40 mg  40 mg Subcutaneous Daily Celio Almanza MD   Stopped at 07/26/19 0908    glucose (GLUTOSE) 40 % oral gel 15 g  15 g Oral PRN Celio Almanza MD        glucagon (rDNA) injection 1 mg  1 mg Intramuscular PRN Celio Almanza MD         Allergies:  No Known Allergies    Social History:   Social History     Socioeconomic History    Marital status:      Spouse name: Not on file    Number of children: Not on file    Years of education: Not on file    Highest education level: Not on file   Occupational History    Not on file   Social Needs    Financial resource strain: Not on file    Food insecurity:     Worry: Not on file     Inability: Not on file    Transportation needs:     Medical: Not on file     Non-medical: Not on file   Tobacco Use    Smoking status: Current Some Day Smoker     Years: 50.00    Smokeless tobacco: Never Used    Tobacco comment: cigarsn 3 per week   Substance and Sexual Activity    Alcohol use:  Yes     Alcohol/week: 0.0 standard drinks     Types: 1 - 2 Shots of liquor per week     Comment: per day    Drug use: No    Sexual activity: Not on file   Lifestyle    Physical activity:     Days per week: Not on file     Minutes per session: Not on file    Stress: Not on file   Relationships    Social connections:     Talks on phone: Not on file     Gets together: Not on file     Attends Mosque service: Not on file     Active member of club or organization: Not on file     Attends meetings of clubs or organizations: Not on file     Relationship status: Not on file    Intimate partner violence:     Fear of current or ex partner: Not on file     Emotionally abused: Not on file     Physically abused: Not on file     Forced sexual activity: Not on file   Other Topics Concern    Not on file   Social History Narrative    Not BUN 22 07/26/2019    PROT 6.0 07/26/2019    PROT 7.5 12/20/2011     Magnesium:    Lab Results   Component Value Date    MG 1.40 07/24/2019     Phosphorus:  No components found for: PO4  Calcium:  No components found for: CA  CBC:    Lab Results   Component Value Date    WBC 77.8 07/26/2019    RBC 2.73 07/26/2019    RBC 4.18 08/04/2017    HGB 8.5 07/26/2019    HCT 27.0 07/26/2019    MCV 99.2 07/26/2019    RDW 17.2 07/26/2019     07/26/2019     DIFF:  Lab Results   Component Value Date    MCV 99.2 07/26/2019    RDW 17.2 07/26/2019      EXAMINATION:   NUCLEAR MEDICINE HEPATOBILIARY SCINTIGRAPHY (HIDA SCAN). 7/25/2019 12:51 pm       TECHNIQUE:   Approximately 6 [id number] Mebrofenin (Choletec) was administered IV.    Then, dynamic images of the abdomen were obtained in the anterior projection   for 60 mins.       Imaging was continued for an additional hour.       COMPARISON:   Ultrasound 07/24/2019       HISTORY:   ORDERING SYSTEM PROVIDED HISTORY: r/u cholecystitis       FINDINGS:   Prompt, homogenous uptake by the liver is noted with normal appearance of   radiotracer excretion into the biliary system.  Clearance of bloodpool   activity appears appropriate.       Normal visualization of small bowel.  The gallbladder is not visualized by   the end of the exam.  There is significant activity in the hepatic flexure   obscuring the location of the gallbladder by the end of the exam and further   imaging was not obtained.           Impression   1.  The gallbladder is not visualized by the end of the exam consistent with   cholecystitis.  Correlate with acuity of symptoms.               Problem List  Patient Active Problem List   Diagnosis    DM (diabetes mellitus) ([address]    CAD (coronary artery disease)    Spinal stenosis, lumbar region, with neurogenic claudication    Leukocytosis    Thrombocytosis (HCC)    CKD (chronic kidney disease) stage 3, GFR 30-59 ml/min ([address]    Myeloproliferative disorder ([address]

## 2019-07-26 NOTE — PROGRESS NOTES
5263  Gross per 24 hour   Intake 1732.8 ml   Output 100 ml   Net 1632.8 ml       Physical Exam Performed:    BP (!) 130/40   Pulse 82   Temp 97.4 °F (36.3 °C) (Temporal)   Resp 16   Ht 5' 8\" (1.727 m)   Wt 136 lb 11.2 oz (62 kg)   SpO2 94%   BMI 20.79 kg/m²     General appearance: Elderly male in no apparent distress, appears stated age and cooperative. HEENT: Pupils equal, round, and reactive to light. Conjunctivae/corneas clear. Neck: Supple, with full range of motion. No jugular venous distention. Trachea midline. Respiratory:  Normal respiratory effort. Clear to auscultation, bilaterally without Rales/Wheezes/Rhonchi. Cardiovascular: Regular rate and rhythm with normal S1/S2 without murmurs, rubs or gallops. Abdomen: Soft, non-tender, non-distended with normal bowel sounds. Musculoskeletal: No clubbing, cyanosis or edema bilaterally. Full range of motion without deformity. Skin: Skin color, texture, turgor normal.  No rashes or lesions. Neurologic:  Neurovascularly intact without any focal sensory/motor deficits.  Cranial nerves: II-XII intact, grossly non-focal.  Psychiatric: Alert and oriented, thought content appropriate, normal insight  Capillary Refill: Brisk,< 3 seconds   Peripheral Pulses: +2 palpable, equal bilaterally       Labs:   Recent Labs     07/24/19 2049 07/25/19  0838 07/26/19  1004   WBC 66.3* 68.4* 77.8*   HGB 9.5* 8.9* 8.5*   HCT 30.9* 28.4* 27.0*    449 447     Recent Labs     07/24/19 2049 07/25/19  0838 07/26/19  1004   * 131* 138   K 4.6 4.6 4.6   CL 94* 95* 104   CO2 25 22 24   BUN 17 18 22*   CREATININE 0.7* 0.7* 0.7*   CALCIUM 9.0 8.7 8.9     Recent Labs     07/24/19 2049 07/25/19  0838 07/26/19  1004   AST 35 27 26   ALT 12 36 36   BILIDIR  --   --  <0.2   BILITOT 0.4 0.4 0.5   ALKPHOS 301* 275* 251*     Recent Labs     07/24/19  2049   TROPONINI <0.01       Urinalysis:      Lab Results   Component Value Date    NITRU Negative 07/24/2019    WBCUA 3-5

## 2019-07-27 NOTE — PROGRESS NOTES
CREATININE 0.7* 0.7* 0.7*     Recent Labs     07/24/19 2049 07/25/19  0838 07/26/19  1004   AST 35 27 26   ALT 12 36 36   BILIDIR  --   --  <0.2   BILITOT 0.4 0.4 0.5   ALKPHOS 301* 275* 251*       Magnesium:    Lab Results   Component Value Date    MG 1.40 07/24/2019         Problem List  Patient Active Problem List   Diagnosis    DM [facility] mellitus) [facility]    CAD (coronary artery disease)    Spinal stenosis, lumbar region, with neurogenic claudication    Leukocytosis    Thrombocytosis (HCC)    CKD (chronic kidney disease) stage 3, GFR 30-59 ml/min (HCC)    Myeloproliferative disorder ([facility]    Encounter for long-term current use of high risk medication    Belly pain    Acute cholecystitis    Moderate malnutrition (HCC)       ASSESSMENT AND PLAN:    Myeloproliferative disorder with thrombocytosis  -The patient has been on Hydrea  -He now has a worsening leukocytosis with blasts on his peripheral smear  -Differential is a reactive leukocytosis with blasts secondary to his underlying myeloproliferative disorder versus AML  -You can see his wbc increase this much with a myeloproliferative disorder with an infection such as acalculous cholecystitis  -I discussed a bone marrow biopsy with the patient this morning. He is not confident he will agree to this. Unfortunately, if he does have AML our treatment options are extremely limited. ONCOLOGIC DISPOSITION:    -possible marrow bx on Monday if the patient consents.      Sheeba Eid MD  Please contact through [facility]

## 2019-07-27 NOTE — PROGRESS NOTES
[facility] SURGERY    PATIENT NAME: Mammie Gilford     TODAY'S DATE: 7/27/2019    CHIEF COMPLAINT: none    INTERVAL HISTORY/HPI:    Pt resting comfortably, denies abdominal pain, taking PO diet (although below normal appetite). Main issue appears to be his hematologic status  Severe leukocytosis, possibly due to known CLL or AML, per Hem/Onc notes today. OBJECTIVE:  VITALS:  BP (!) 148/72   Pulse 88   Temp 98.6 °F (37 °C) (Oral)   Resp 18   Ht 5' 8\" (1.727 m)   Wt 136 lb 11.2 oz (62 kg)   SpO2 92%   BMI 20.79 kg/m²     INTAKE/OUTPUT:    I/O last 3 completed shifts: In: 0   Out: 350 [Urine:350]  I/O this shift: In: 480 [P.O.:480]  Out: 100 [Urine:100]    CONSTITUTIONAL:  awake and alert  LUNGS:     ABDOMEN:   , soft, non-distended, non-tender     Data:  CBC:   Recent Labs     07/25/19  0838 07/26/19  1004 07/27/19  0822   WBC 68.4* 77.8* 93.4*   HGB 8.9* 8.5* 8.9*   HCT 28.4* 27.0* 28.8*    447 424     BMP:    Recent Labs     07/25/19  0838 07/26/19  1004 07/27/19  0822   * 138 134*   K 4.6 4.6 5.3*   CL 95* 104 101   CO2 22 24 21   BUN 18 22* 31*   CREATININE 0.7* 0.7* 0.8   GLUCOSE 356* 205* 392*     Hepatic:   Recent Labs     07/24/19 2049 07/25/19  0838 07/26/19  1004   AST 35 27 26   ALT 12 36 36   BILITOT 0.4 0.4 0.5   ALKPHOS 301* 275* 251*     Mag:      Recent Labs     07/24/19 2049   MG 1.40*      Phos:   No results for input(s): PHOS in the last 72 hours. INR: No results for input(s): INR in the last 72 hours. Radiology Review:         ASSESSMENT AND PLAN:  Acute cholecystitis, clinically resolved   - low fat diet as tolerated   - primary medical decisions deferred to Hem/Onc at this time. Will follow peripherally.     Electronically signed by Alisha Birmingham MD

## 2019-07-28 NOTE — PROGRESS NOTES
Chart reviewed, case discussed w/ Dr Delaney Holter. Awaiting decision on possible bone marrow bx. Leukocytosis continues to worsen. Pt remains asymptomatic in abdomen (eating, no abd pain). Will continue to follow peripherally until final determination of hematologic status made. Please call w/ further questions.     DTW

## 2019-07-28 NOTE — PROGRESS NOTES
PROGRESS NOTE  S:88 yrs Patient  admitted on 7/24/2019 with Belly pain [R10.9]  Belly pain [R10.9] . Today he feels well. No abd pain. complains of R knee pain related to gout    Exam:   Vitals:    07/28/19 0736   BP: 128/70   Pulse: 99   Resp: 18   Temp: 98.5 °F (36.9 °C)   SpO2: 90%      General appearance: alert, appears stated age and cooperative  HEENT: Oropharynx clear, no lesions and Neck supple with midline trachea  Neck: no adenopathy and supple, symmetrical, trachea midline  Lungs: clear to auscultation bilaterally  Heart: regular rate and rhythm, S1, S2 normal, no murmur, click, rub or gallop  Abdomen: soft, non-tender; bowel sounds normal; no masses,  no organomegaly  Extremities: extremities normal, atraumatic, no cyanosis or edema     Medications: Reviewed    Labs:  CBC:   Recent Labs     07/25/19  0838 07/26/19  1004 07/27/19  0822   WBC 68.4* 77.8* 93.4*   HGB 8.9* 8.5* 8.9*   HCT 28.4* 27.0* 28.8*   MCV 99.3 99.2 98.9    447 424     BMP:   Recent Labs     07/26/19  1004 07/27/19  0822 07/27/19  1646    134* 130*   K 4.6 5.3* 5.2*    101 98*   CO2 24 21 24   BUN 22* 31* 36*   CREATININE 0.7* 0.8 1.0     LIVER PROFILE:   Recent Labs     07/25/19  0838 07/26/19  1004   AST 27 26   ALT 36 36   LIPASE  --  31.0   PROT 6.1* 6.0*   BILIDIR  --  <0.2   BILITOT 0.4 0.5   ALKPHOS 26* 26*     Impression:80year old male with history of DM2, CAD, IPF, and CLL presents with Lt epigastric/LUQ pain. US showed gallstones. EGD showed superficial duodenal erosion    Recommendation:  1. Continue supportive care  2. PPI daily  3. PT/OT  4.  Advance diet as tolerated      Nicole Cisse MD  8:04 AM 7/28/2019

## 2019-07-28 NOTE — CONSULTS
Pharmacy to dose Vancomycin     DX: PNA  Height: 68\"  Weight: 62 kg  Estimated Creatinine Clearance: 45 mL/min (based on SCr of 1 mg/dL).    750 mg IV q18h.   Vancomycin trough due 7/30 @ [address] [place] [address]

## 2019-07-28 NOTE — PROGRESS NOTES
Hospitalist Progress Note      PCP: No primary care provider on file. Date of Admission: 7/24/2019    Chief Complaint: Abdominal pain     Hospital Course:   Pt is an 81 yo with PMH of smoking, HLD, DM2, CAD, IPF and CLL who presents to [facility] with epigastric and lower chest pain, anorexia, and intermittent n/v for the last few days. In the ED he was found to have SIRS and RUQ US showed probable acute calculous cholecystitis. The patient says that whenever he eats he has early satiety and gets epigastric pain with nausea. He denies any subjective fever or chills but he did have a fever here overnight. Pain is moderate in severity, no radiation, intermittent, sharp, better with NPO status. Subjective:   Pt is on RA. Tmax 101.1 this morning. + dyspnea. No chest pain. Complains of neck pain. No N/V/D. No abdominal pain. Has increased WOB. Lethargic today (normally alert and oriented and able to hold a conversation).      Medications:  Reviewed    Infusion Medications    sodium chloride 75 mL/hr at 07/28/19 1355    dextrose       Scheduled Medications    ipratropium-albuterol  1 ampule Inhalation Q4H WA    vancomycin  750 mg Intravenous Q18H    hydroxyurea  1,000 mg Oral BID    pantoprazole  40 mg Intravenous Daily    insulin lispro  0-18 Units Subcutaneous TID WC    insulin lispro  0-9 Units Subcutaneous Nightly    insulin glargine  10 Units Subcutaneous BID    carbidopa-levodopa  1 tablet Oral TID    colchicine  0.6 mg Oral BID    ipratropium  2 spray Nasal BID    senna  1 tablet Oral Daily    propranolol  20 mg Oral BID    DULoxetine  30 mg Oral Daily    celecoxib  200 mg Oral Daily    atorvastatin  40 mg Oral Daily    sodium chloride flush  10 mL Intravenous 2 times per day    meropenem  1 g Intravenous Q8H    aspirin  81 mg Oral Daily    enoxaparin  40 mg Subcutaneous Daily     PRN Meds: zolpidem, traMADol, sodium chloride flush, magnesium hydroxide, ondansetron,

## 2019-07-29 NOTE — PROGRESS NOTES
mL/hr at 07/29/19 0436 100 mL/hr at 07/29/19 0436    glucose (GLUTOSE) 40 % oral gel 15 g  15 g Oral PRN Alicia Batres MD        glucagon (rDNA) injection 1 mg  1 mg Intramuscular PRN Alicia Batres MD             Labs:  CBC:   Recent Labs     07/27/19  0822 07/28/19  1011 07/29/19  0910   WBC 93.4* 111.0* 146.6*   HGB 8.9* 8.9* 8.3*   HCT 28.8* 29.9* 28.1*   MCV 98.9 100.8* 105.0*    256 177     BMP:   Recent Labs     07/27/19  1646 07/28/19  1011 07/29/19  0911   * 132* 135*   K 5.2* 5.2* 5.6*   CL 98* 100 101   CO2 24 19* 12*   BUN 36* 45* 60*   CREATININE 1.0 1.0 1.8*     LIVER PROFILE:   Recent Labs     07/28/19  1313   AST 26   ALT <5*   LIPASE 70.0*   PROT 5.6*   BILIDIR 0.3   BILITOT 0.7   ALKPHOS 0*     Impression:80year old male with history of DM2, CAD, IPF, and CLL presents with Lt epigastric/LUQ pain. US showed gallstones. EGD showed superficial duodenal erosion    Recommendation:  1. Continue supportive care  2. PPI daily  3. PT/OT  4. Advance diet as tolerated  5. Will sign off.       Batsheva Moura MD  3:10 PM 7/29/2019

## 2019-07-29 NOTE — PROGRESS NOTES
Speech Language Pathology  Attempt Note    SLP reviewed chart, spoke with RN who ok'd entry of SLP. RN reported significant difficulty with meds yesterday. Pt was lying with eyes open upon entry. SLP attempted OME and pt demonstrated minimal lingual movement and protrusion, reduced labial lateralization, and no vocalizations. Pt unable to produce any voicing, unable to complete cough or throat clear, and unable to complete swallow on command despite max cues. No PO trials given this date as pt is at a high risk of aspiration given inability to complete above tasks. SLP to follow to complete BSE if pt condition allows. RN aware. Thank you.     [facility], [place], [address] [facility]  Speech-Language Pathologist

## 2019-07-29 NOTE — PROGRESS NOTES
ADVANCED CARE PLANNING    Cynthia Cervantes       :  10/7/1930              MRN:  [id number]  Admission Date: 2019  8:20 PM  Date of Discussion:  2019      Purpose of Encounter: Advanced care planning in light of lethargy, respiratory distress, fever/sepsis, possible AML. Parties in attendance: :Adolfo Hill, Janeth Marshall Mai, APRN - CNP, Family members: Pt's son. Decisional Capacity:Yes    Objective/Medical Story: Pt is an 81 yo male who presented to [facility] with abdominal pain associated with N&V found to have acute cholecystitis. Pt started on broad spectrum abx on admission. S/p EGD which showed superficial duodenal ulcer/erosion. Pt was clinically improving and was started on clear liquid diet with tolerance of diet advancement. Pt WBC continued to rise despite clinical improvement. Given pt's history of myeloproliferative disorder there was a concern for some underlying hematological disorder. Hem/Onc was consulted. Unclear if rising leukocytosis was reactive or possible AML. Bone marrow biopsy was discussed but pt seemed apprehensive to proceed with. On  pt was lethargic but able to answer questions, had increased work of breathing, subjective dyspnea, extremely weak, developed fever and lactic acidosis. Given pt's age, multiple co-morbidities and decline in his status, code status was broached with family. Discussed with pt and his son at length the options available to him. Pt and his son wished to amend code status and change to limited (DNR/DNI) from full code . Dr. Aranza Ravi was present during conversation. Active Diagnoses: Active Hospital Problems    Diagnosis Date Noted    Acute cholecystitis [K81.0] 2019    Moderate malnutrition ([address] [E44.0] 2019    Belly pain [R10.9] 2019         Subjective/Patient Story: Patient understands that his/her function continues to deteriorate.     Patient no longer wishes further curative

## 2019-07-29 NOTE — DISCHARGE SUMMARY
prominent bilateral inguinal lymph nodes. No soft tissue masses or   abnormal fluid collections. IMPRESSION:   No aortic aneurysm or dissection. No acute findings in the chest, abdomen or pelvis. 3 cm partially calcified sebaceous cyst in the subcutaneous fat of the right   lower back. Additional 3.5 cm presumed sebaceous cyst in the subcutaneous fat   of the right buttock near the midline. XR CHEST STANDARD (2 VW)   Final Result   Pulmonary vascular congestion along with increased interstitial opacity. Correlate with any clinical evidence developing pulmonary edema. No focal   infiltrate is seen. Consults:     IP CONSULT TO HOSPITALIST  IP CONSULT TO GI  IP CONSULT TO GENERAL SURGERY  IP CONSULT TO ONCOLOGY  IP CONSULT TO PHARMACY  IP CONSULT TO INFECTIOUS DISEASES  IP CONSULT TO HOSPICE  IP CONSULT TO HOSPICE    Disposition:  Hospice [facility], [address]     Condition at Discharge: Terminal    Discharge Instructions/Follow-up:  Hospice care    Code Status:  DNR-CC     Activity: activity as tolerated    Diet: npo      Discharge Medications:     Current Discharge Medication List           Details   LORazepam (LORAZEPAM INTENSOL) 2 MG/ML concentrated solution Take 0.5 mLs by mouth every hour as needed (anxiety, agitation) for up to 14 days. Qty: 30 mL, Refills: 0    Associated Diagnoses: Cholecystitis      morphine sulfate 20 MG/ML concentrated oral solution Take 0.25 mLs by mouth every hour as needed for Pain for up to 10 days. Qty: 30 mL, Refills: 0    Comments: Reduce doses taken as pain becomes manageable  Associated Diagnoses: Cholecystitis               Time Spent on discharge is more than 30 minutes in the examination, evaluation, counseling and review of medications and discharge plan. Signed:    Kendall Sanon MD   7/29/2019      Thank you No primary care provider on file. for the opportunity to be involved in this patient's care.  If you have any questions or concerns

## 2019-07-30 LAB
CULTURE, BLOOD 2: NORMAL
URINE CULTURE, ROUTINE: NORMAL

## 2019-08-02 LAB
BLOOD CULTURE, ROUTINE: NORMAL
CULTURE, BLOOD 2: NORMAL

## (undated) DEVICE — ENDO CARRY-ON PROCEDURE KIT INCLUDES SUCTION TUBING, LUBRICANT, GAUZE, BIOHAZARD STICKER, TRANSPORT PAD AND INTERCEPT BEDSIDE KIT.: Brand: ENDO CARRY-ON PROCEDURE KIT

## (undated) DEVICE — Device: Brand: DEFENDO VALVE AND CONNECTOR KIT

## (undated) DEVICE — KENDALL 500 SERIES DIAPHORETIC FOAM MONITORING ELECTRODE - TEAR DROP SHAPE ( 5/PK): Brand: KENDALL

## (undated) DEVICE — CONMED SCOPE SAVER BITE BLOCK, 20X27 MM: Brand: SCOPE SAVER

## (undated) DEVICE — KIT HUMD 350ML NSL CANN HI FLOW STRT 7FT O2 TBNG AD CURAPLEX

## (undated) DEVICE — GOWN IMPERV UNIV BLU PLAS FLM PERF OPN BK W THUMBHOOKS